# Patient Record
Sex: FEMALE | Race: WHITE | NOT HISPANIC OR LATINO | Employment: FULL TIME | URBAN - METROPOLITAN AREA
[De-identification: names, ages, dates, MRNs, and addresses within clinical notes are randomized per-mention and may not be internally consistent; named-entity substitution may affect disease eponyms.]

---

## 2017-01-26 ENCOUNTER — HOSPITAL ENCOUNTER (EMERGENCY)
Facility: HOSPITAL | Age: 42
Discharge: HOME | End: 2017-01-26
Attending: EMERGENCY MEDICINE | Admitting: EMERGENCY MEDICINE

## 2017-01-26 ENCOUNTER — APPOINTMENT (OUTPATIENT)
Dept: GENERAL RADIOLOGY | Facility: HOSPITAL | Age: 42
End: 2017-01-26

## 2017-01-26 VITALS
DIASTOLIC BLOOD PRESSURE: 90 MMHG | RESPIRATION RATE: 16 BRPM | HEART RATE: 71 BPM | HEIGHT: 65 IN | WEIGHT: 155 LBS | BODY MASS INDEX: 25.83 KG/M2 | TEMPERATURE: 98 F | OXYGEN SATURATION: 98 % | SYSTOLIC BLOOD PRESSURE: 124 MMHG

## 2017-01-26 DIAGNOSIS — S61.210A LACERATION OF RIGHT INDEX FINGER: Primary | ICD-10-CM

## 2017-01-26 PROCEDURE — 90715 TDAP VACCINE 7 YRS/> IM: CPT | Performed by: PHYSICIAN ASSISTANT

## 2017-01-26 PROCEDURE — 90471 IMMUNIZATION ADMIN: CPT | Performed by: PHYSICIAN ASSISTANT

## 2017-01-26 PROCEDURE — 25010000002 TDAP 5-2.5-18.5 LF-MCG/0.5 SUSPENSION: Performed by: PHYSICIAN ASSISTANT

## 2017-01-26 PROCEDURE — 99283 EMERGENCY DEPT VISIT LOW MDM: CPT

## 2017-01-26 PROCEDURE — 73140 X-RAY EXAM OF FINGER(S): CPT

## 2017-01-26 RX ORDER — LIDOCAINE HYDROCHLORIDE 10 MG/ML
10 INJECTION, SOLUTION INFILTRATION; PERINEURAL ONCE
Status: COMPLETED | OUTPATIENT
Start: 2017-01-26 | End: 2017-01-26

## 2017-01-26 RX ADMIN — LIDOCAINE HYDROCHLORIDE 10 ML: 10 INJECTION, SOLUTION INFILTRATION; PERINEURAL at 18:13

## 2017-01-26 RX ADMIN — TETANUS TOXOID, REDUCED DIPHTHERIA TOXOID AND ACELLULAR PERTUSSIS VACCINE, ADSORBED 0.5 ML: 5; 2.5; 8; 8; 2.5 SUSPENSION INTRAMUSCULAR at 18:09

## 2017-01-26 RX ADMIN — SODIUM BICARBONATE 2.5 MEQ: 0.2 INJECTION, SOLUTION INTRAVENOUS at 18:13

## 2017-01-26 NOTE — ED PROVIDER NOTES
I supervised care provided by the midlevel provider.  We have discussed this patient's history, physical exam, and treatment plan.  I have reviewed the note and personally saw and examined the patient and agree with the plan of care.    Pt presents c/o R index finger injury after she accidentally slammed it in a car door. On exam, laceration to the palmar surface of DIP and distal phalanx of R second finger. Tendon function is intact. Finger partially anesthestized now due to digital block. XR R finger shows soft tissue irregularity consistent with injury but no fracture. Laceration repaired in ED by Jeffrey Solorzano PA-C. I agree with plan for splint, discharge, and follow up with hand specialist.     Documentation assistance provided by jose l Gonzalez for Dr. Barry. Information recorded by the scribe was done at my direction and has been verified and validated by me.     Yehuda Gonzalez  01/26/17 1922       Jeffrey Barry MD  01/27/17 9519

## 2017-01-26 NOTE — ED PROVIDER NOTES
EMERGENCY DEPARTMENT ENCOUNTER    CHIEF COMPLAINT  Chief Complaint: Finger Injury  History given by: Patient  History limited by: Nothing  Room Number: 04/04  PMD: Akshat Brooks MD      HPI:  Pt is a 41 y.o. female who presents to the ED after the patient slammed her right index finger in a car door four hours ago. The patient sustained a 1cm laceration to the ulnar aspect of her right index finger. She reports that the door closed on her finger and had to open the door handle to release her finger. Pt has slight decreased sensation in her finger but she denies any decreased motor function. She had a tetanus shot approximately six years ago.      Duration:  1-2 seconds  Onset: Sudden  Timing: Constant  Location: Right index finger  Radiation: None  Quality: Dull  Intensity/Severity: Moderate  Progression: No Change  Associated Symptoms: Laceration  Aggravating Factors: Palpation  Alleviating Factors: Rest  Previous Episodes: None  Treatment before arrival: None mentioned     PAST MEDICAL HISTORY  Active Ambulatory Problems     Diagnosis Date Noted   • No Active Ambulatory Problems     Resolved Ambulatory Problems     Diagnosis Date Noted   • No Resolved Ambulatory Problems     Past Medical History   Diagnosis Date   • Graves disease        PAST SURGICAL HISTORY  Past Surgical History   Procedure Laterality Date   • Mouth surgery         FAMILY HISTORY  Family History   Problem Relation Age of Onset   • No Known Problems Mother    • No Known Problems Sister    • Kidney cancer Maternal Grandmother    • Heart disease Paternal Grandfather        SOCIAL HISTORY  Social History     Social History   • Marital status: Single     Spouse name: N/A   • Number of children: N/A   • Years of education: N/A     Occupational History   • Not on file.     Social History Main Topics   • Smoking status: Never Smoker   • Smokeless tobacco: Not on file   • Alcohol use Yes      Comment: 2-3/week   • Drug use: No   • Sexual activity:  Not on file     Other Topics Concern   • Not on file     Social History Narrative   • No narrative on file       ALLERGIES  Review of patient's allergies indicates no known allergies.    REVIEW OF SYSTEMS  Review of Systems   Constitutional: Negative for chills and fatigue.   HENT: Negative for congestion, rhinorrhea and sore throat.    Eyes: Negative for pain.   Respiratory: Negative for cough, shortness of breath and wheezing.    Cardiovascular: Negative for chest pain, palpitations and leg swelling.   Gastrointestinal: Negative for abdominal pain, diarrhea, nausea and vomiting.   Genitourinary: Negative for difficulty urinating, dysuria, flank pain and frequency.   Musculoskeletal: Negative for arthralgias, myalgias, neck pain and neck stiffness.   Skin: Negative for rash.        Laceration right index finger   Neurological: Negative for dizziness, speech difficulty, weakness, light-headedness, numbness and headaches.   Psychiatric/Behavioral: Negative.    All other systems reviewed and are negative.      PHYSICAL EXAM  ED Triage Vitals   Temp Heart Rate Resp BP SpO2   01/26/17 1300 01/26/17 1300 01/26/17 1300 01/26/17 1311 01/26/17 1300   97.9 °F (36.6 °C) 136 20 158/112 98 %      Temp src Heart Rate Source Patient Position BP Location FiO2 (%)   01/26/17 1300 01/26/17 1300 -- -- --   Tympanic Monitor          Physical Exam   Constitutional: She is oriented to person, place, and time and well-developed, well-nourished, and in no distress. No distress.   Musculoskeletal: Normal range of motion. She exhibits no edema.   Neurological: She is alert and oriented to person, place, and time.   Slight decreased sensation ulnar aspect right index finger distal to laceration, poor two point discrimination.     Skin: Skin is warm and dry. No rash noted.   Laceration right index finger to the distal phalanx, ulnar aspect that does not involve nail bed. Good tendon function.     Psychiatric: Mood and affect normal.   Nursing  note and vitals reviewed.      RADIOLOGY  XR Finger 2+ View Right   Final Result         17:25  Reviewed XR R Finger - Soft tissue irregularity consistent with injury. The osseous structures are normal no fracture. Independently viewed by me. Interpreted by radiologist.     I ordered the above noted radiological studies. Interpreted by radiologist. Reviewed by me in PACS.       PROCEDURES  Laceration Repair  Date/Time: 1/26/2017 6:15 PM  Performed by: BRANNON QUIROZ  Authorized by: BRANNON NOLASCO   Consent: Verbal consent obtained.  Consent given by: patient  Patient understanding: patient states understanding of the procedure being performed  Patient consent: the patient's understanding of the procedure matches consent given  Patient identity confirmed: verbally with patient  Body area: upper extremity  Location details: right index finger  Laceration length: 1 cm  Foreign bodies: no foreign bodies  Tendon involvement: none  Nerve involvement: none  Vascular damage: no  Anesthesia: digital block    Anesthesia:  Anesthesia: digital block  Local Anesthetic: lidocaine 1% with epinephrine   Anesthetic total: 8 mL  Sedation:  Patient sedated: no    Irrigation solution: saline  Irrigation method: syringe  Amount of cleaning: standard  Debridement: none  Degree of undermining: none  Skin closure: 5-0 nylon  Number of sutures: 7  Technique: running  Approximation: close  Approximation difficulty: simple  Patient tolerance: Patient tolerated the procedure well with no immediate complications    Splint Application  Date/Time: 1/26/2017 6:42 PM  Performed by: BRANNON QUIROZ  Authorized by: BRANNON NOLASCO   Consent: Verbal consent obtained.  Consent given by: patient  Patient understanding: patient states understanding of the procedure being performed  Patient consent: the patient's understanding of the procedure matches consent given  Patient identity confirmed: verbally with patient  Location details: right index finger  Splint  type: static finger  Supplies used: aluminum splint  Post-procedure: The splinted body part was neurovascularly unchanged following the procedure.  Patient tolerance: Patient tolerated the procedure well with no immediate complications        PROGRESS AND CONSULTS  ED Course     13:16  Ordered XR R Finger for further evaluation    17:21  Ordered lidocaine for laceration repair     17:50  Discussed case with Dr. Mathias (Hand surgeon). Reviewed history, exam, results and treatments.  Discussed concerns and plan of care.     18:38  Rechecked patient and they are resting comfortably after the laceration repair. Discussed the patient's pertinent imaging results, including XR Finger showed no fracture. Discussed plan to discharge the patient home and recommended follow up with  in 10-14 days to have the sutures removed. Patient agrees with the plan and all questions were addressed.     Latest vital signs   BP- 135/85 HR- 72 Temp- 97.9 °F (36.6 °C) (Tympanic) O2 sat- 95%      MEDICAL DECISION MAKING  Results were reviewed/discussed with the patient and they were also made aware of online access. Pt also made aware that some labs, such as cultures, will not be resulted during ER visit and follow up with PMD is necessary.     MDM  Number of Diagnoses or Management Options     Amount and/or Complexity of Data Reviewed  Tests in the radiology section of CPT®: ordered and reviewed (XR R Finger - Soft tissue irregularity consistent with injury. The osseous structures are normal no fracture.  )    Patient Progress  Patient progress: stable         DIAGNOSIS  Final diagnoses:   Laceration of right index finger       DISPOSITION  DISCHARGE    Patient discharged in stable condition.    Reviewed implications of results, diagnosis, meds, responsibility to follow up, warning signs and symptoms of possible worsening, potential complications and reasons to return to ER, including suture issues.     Patient/Family voiced  understanding of above instructions.    Discussed plan for discharge, as there is no emergent indication for admission.  Pt/family is agreeable and understands need for follow up and repeat testing.  Pt is aware that discharge does not mean that nothing is wrong but it indicates no emergency is present that requires admission and they must continue care with follow-up as given below or physician of their choice.     FOLLOW-UP  You Mathias MD  [address] [id number] [phone]      for suture removal in 10-14 days         Medication List      Notice     No changes were made to your prescriptions during this visit.        Latest Documented Vital Signs:  As of 6:51 PM  BP- 135/85 HR- 72 Temp- 97.9 °F (36.6 °C) (Tympanic) O2 sat- 95%    --  Documentation assistance provided by jose l King for Jeffrey Solorzano PA-C.  Information recorded by the scribe was done at my direction and has been verified and validated by me.       Woody Hendrix  01/26/17 1901       DANIAL Rueda  01/27/17 0004

## 2017-01-27 ENCOUNTER — TELEPHONE (OUTPATIENT)
Dept: SOCIAL WORK | Facility: HOSPITAL | Age: 42
End: 2017-01-27

## 2017-04-10 ENCOUNTER — OFFICE VISIT (OUTPATIENT)
Dept: OBSTETRICS AND GYNECOLOGY | Age: 42
End: 2017-04-10

## 2017-04-10 VITALS
HEIGHT: 65 IN | WEIGHT: 162 LBS | SYSTOLIC BLOOD PRESSURE: 130 MMHG | BODY MASS INDEX: 26.99 KG/M2 | DIASTOLIC BLOOD PRESSURE: 80 MMHG

## 2017-04-10 DIAGNOSIS — Z01.419 ENCOUNTER FOR GYNECOLOGICAL EXAMINATION WITHOUT ABNORMAL FINDING: Primary | ICD-10-CM

## 2017-04-10 DIAGNOSIS — N64.3 GALACTORRHEA IN FEMALE: ICD-10-CM

## 2017-04-10 PROCEDURE — 99396 PREV VISIT EST AGE 40-64: CPT | Performed by: OBSTETRICS & GYNECOLOGY

## 2017-04-10 NOTE — PROGRESS NOTES
Routine Annual Visit    4/10/2017    Patient: Eileen Johnston          MR#:[id number]      Chief Complaint   Patient presents with   • Annual Exam     PT HERE FOR ROUTINE ANNUAL EXAM, DOING WELL. MAMMOGRAM TODAY. LAST PAP 2016 (NEG AND NEG HPV).       History of Present Illness    41 y.o. female No obstetric history on file. who presents for annual exam.  Menses regular  Job is very busy  Slight drop of discharge from breast when pinches nipple, no spont. Discharge and no mass or pain  Yellow to clear color  Graves disease and now in good control  Last 2 paps are neg /neg  Will repeat pap next year  Also complaints of lesion /bump on left labia, not growing, I reassured her last year  Went to [place] and [place] last year, this year to conferences, [place] [facility] [place]           Patient's last menstrual period was 2017 (approximate).  Obstetric History:  OB History      Para Term  AB TAB SAB Ectopic Multiple Living      0                Menstrual History:     Patient's last menstrual period was 2017 (approximate).       Sexual History:       ________________________________________  There is no problem list on file for this patient.      Past Medical History:   Diagnosis Date   • Cervical dysplasia    • GRETCHEN I (cervical intraepithelial neoplasia I)    • Graves disease    • Hyperthyroidism    • LGSIL on Pap smear of cervix        Past Surgical History:   Procedure Laterality Date   • COLPOSCOPY W/ BIOPSY / CURETTAGE     • MOUTH SURGERY         History   Smoking Status   • Never Smoker   Smokeless Tobacco   • Not on file       has a current medication list which includes the following prescription(s): methimazole.  ________________________________________    Current contraception: condoms  History of abnormal Pap smear: yes - hpv, low grade, last 2 paps neg /neg  Family history of Breast cancer: no  Family history of uterine or ovarian cancer: no  Family History of colon cancer/colon polyps:  "no  History of abnormal mammogram: no      The following portions of the patient's history were reviewed and updated as appropriate: allergies, current medications, past family history, past medical history, past social history, past surgical history and problem list.    Review of Systems    Pertinent items are noted in HPI.     Objective   Physical Exam    /80  Ht 65\" (165.1 cm)  Wt 162 lb (73.5 kg)  LMP 03/22/2017 (Approximate)  BMI 26.96 kg/m2   BP Readings from Last 3 Encounters:   04/10/17 130/80   01/26/17 124/90   07/15/16 110/66      Wt Readings from Last 3 Encounters:   04/10/17 162 lb (73.5 kg)   01/26/17 155 lb (70.3 kg)   07/15/16 153 lb (69.4 kg)      BMI: Estimated body mass index is 26.96 kg/(m^2) as calculated from the following:    Height as of this encounter: 65\" (165.1 cm).    Weight as of this encounter: 162 lb (73.5 kg).      General:   alert, appears stated age and cooperative   Abdomen: soft, non-tender, without masses or organomegaly   Breast: inspection negative, no nipple discharge or bleeding, no masses or nodularity palpable   Vulva: normal   Vagina: normal mucosa   Cervix: no cervical motion tenderness and no lesions   Uterus: normal size, mobile or non-tender   Adnexa: normal adnexa and no mass, fullness, tenderness     Assessment:    1. Normal annual exam   Assessment     ICD-10-CM ICD-9-CM   1. Encounter for gynecological examination without abnormal finding Z01.419 V72.31   2. Galactorrhea in female N64.3 611.6     Plan:    Plan     [x]  Mammogram request made  []  PAP done  []  Labs:   []  GC/Chl/TV  []  DEXA scan   []  Referral for colonoscopy:       Eileen was seen today for annual exam.    Diagnoses and all orders for this visit:    Encounter for gynecological examination without abnormal finding    Galactorrhea in female  -     Prolactin      "

## 2017-04-11 ENCOUNTER — TELEPHONE (OUTPATIENT)
Dept: OBSTETRICS AND GYNECOLOGY | Age: 42
End: 2017-04-11

## 2017-04-11 LAB — PROLACTIN SERPL-MCNC: 11.9 NG/ML (ref 4.8–23.3)

## 2017-04-11 NOTE — TELEPHONE ENCOUNTER
----- Message from Stephanie Orta MD sent at 4/11/2017  1:38 PM EDT -----  Notify negative/ normal prolactin level

## 2017-09-07 ENCOUNTER — OFFICE VISIT (OUTPATIENT)
Dept: FAMILY MEDICINE CLINIC | Facility: CLINIC | Age: 42
End: 2017-09-07

## 2017-09-07 VITALS
BODY MASS INDEX: 26.16 KG/M2 | RESPIRATION RATE: 16 BRPM | WEIGHT: 157 LBS | DIASTOLIC BLOOD PRESSURE: 64 MMHG | OXYGEN SATURATION: 99 % | HEIGHT: 65 IN | HEART RATE: 64 BPM | SYSTOLIC BLOOD PRESSURE: 110 MMHG

## 2017-09-07 DIAGNOSIS — H53.9 TRANSIENT VISION DISTURBANCE OF BOTH EYES: ICD-10-CM

## 2017-09-07 DIAGNOSIS — Z00.00 ROUTINE GENERAL MEDICAL EXAMINATION AT A HEALTH CARE FACILITY: Primary | ICD-10-CM

## 2017-09-07 DIAGNOSIS — S67.21XS: ICD-10-CM

## 2017-09-07 DIAGNOSIS — E05.00 GRAVES DISEASE: ICD-10-CM

## 2017-09-07 PROBLEM — S67.10XA CRUSHING INJURY OF FINGER OF RIGHT HAND: Status: ACTIVE | Noted: 2017-01-01

## 2017-09-07 LAB
ALBUMIN SERPL-MCNC: 4.2 G/DL (ref 3.5–5.2)
ALBUMIN/GLOB SERPL: 1.2 G/DL
ALP SERPL-CCNC: 58 U/L (ref 39–117)
ALT SERPL-CCNC: 14 U/L (ref 1–33)
AST SERPL-CCNC: 17 U/L (ref 1–32)
BILIRUB SERPL-MCNC: 0.6 MG/DL (ref 0.1–1.2)
BUN SERPL-MCNC: 10 MG/DL (ref 6–20)
BUN/CREAT SERPL: 13.3 (ref 7–25)
CALCIUM SERPL-MCNC: 8.9 MG/DL (ref 8.6–10.5)
CHLORIDE SERPL-SCNC: 102 MMOL/L (ref 98–107)
CHOLEST SERPL-MCNC: 173 MG/DL (ref 0–200)
CHOLEST/HDLC SERPL: 2.54 {RATIO}
CO2 SERPL-SCNC: 27.1 MMOL/L (ref 22–29)
CREAT SERPL-MCNC: 0.75 MG/DL (ref 0.57–1)
ERYTHROCYTE [DISTWIDTH] IN BLOOD BY AUTOMATED COUNT: 13.6 % (ref 11.7–13)
GLOBULIN SER CALC-MCNC: 3.5 GM/DL
GLUCOSE SERPL-MCNC: 89 MG/DL (ref 65–99)
HCT VFR BLD AUTO: 42.6 % (ref 35.6–45.5)
HDLC SERPL-MCNC: 68 MG/DL (ref 40–60)
HGB BLD-MCNC: 13.4 G/DL (ref 11.9–15.5)
LDLC SERPL CALC-MCNC: 92 MG/DL (ref 0–100)
MCH RBC QN AUTO: 28.6 PG (ref 26.9–32)
MCHC RBC AUTO-ENTMCNC: 31.5 G/DL (ref 32.4–36.3)
MCV RBC AUTO: 90.8 FL (ref 80.5–98.2)
PLATELET # BLD AUTO: 261 10*3/MM3 (ref 140–500)
POTASSIUM SERPL-SCNC: 4.6 MMOL/L (ref 3.5–5.2)
PROT SERPL-MCNC: 7.7 G/DL (ref 6–8.5)
RBC # BLD AUTO: 4.69 10*6/MM3 (ref 3.9–5.2)
SODIUM SERPL-SCNC: 141 MMOL/L (ref 136–145)
TRIGL SERPL-MCNC: 64 MG/DL (ref 0–150)
VLDLC SERPL CALC-MCNC: 12.8 MG/DL (ref 5–40)
WBC # BLD AUTO: 6.29 10*3/MM3 (ref 4.5–10.7)

## 2017-09-07 PROCEDURE — 99213 OFFICE O/P EST LOW 20 MIN: CPT | Performed by: FAMILY MEDICINE

## 2017-09-07 PROCEDURE — 99396 PREV VISIT EST AGE 40-64: CPT | Performed by: FAMILY MEDICINE

## 2017-09-07 NOTE — PATIENT INSTRUCTIONS
Annual Wellness  Personal Prevention Plan of Service   I will call you with lab results.  Call me if you decide you want an OT referral.  If you have a vision disturbance again, walk away from the computer and if it  Does not resolve in 15-20 minutes, go to the ER.  Date of Office Visit:  2017  Encounter Provider:  Akshat Brooks MD  Place of Service:  [facility]  Patient Name: Eileen Johnston  :  1975    As part of the Annual Wellness portion of your visit today, we are providing you with this personalized preventive plan of services (PPPS). This plan is based upon recommendations of the [place] Preventive Services Task Force (USPSTF) and the Advisory Committee on Immunization Practices (ACIP).    This lists the preventive care services that should be considered, and provides dates of when you are due. Items listed as completed are up-to-date and do not require any further intervention.    Health Maintenance   Topic Date Due   • PAP SMEAR  07/15/2019   • TDAP/TD VACCINES (3 - Td) 2027   • INFLUENZA VACCINE  Addressed       Orders Placed This Encounter   Procedures   • CBC (No Diff)     Order Specific Question:   [facility] Has the patient fasted?     Answer:   Yes   • Comprehensive Metabolic Panel     Order Specific Question:   [facility] Has the patient fasted?     Answer:   Yes   • Lipid Panel With / Chol / HDL Ratio     Order Specific Question:   [facility] Has the patient fasted?     Answer:   Yes       Return in about 1 year (around 2018) for Annual physical.

## 2017-09-07 NOTE — PROGRESS NOTES
"Preventive Exam    History of Present Illness: Eileen is here for check up and review of routine health maintenance. She states she is doing well and has no concerns except she has a hx of Graves disease and has been followed endocrinology. She has been on methimazole.   She has a past hx of an injured right index finger. This happened in January after she shut a car door on her finger. She was very well repaired by the [facility]. She is having some discomfort but has full range of motion and she is wondering if there is anything she should be doing.  She also reports having  2 episodes of vision disturbance after she has been working on the computer for several hours. Both episodes occurred after a full day of work and concentration. Her vision would get blurry. She would stop and rest and noticed that after 15 minutes or so, all returned to normal.   REVIEW OF SYSTEMS  Constitutional: Negative.    HENT: Negative.    Eyes: Negative.    Respiratory: Negative.    Cardiovascular: Negative.    Gastrointestinal: Negative.    Endocrine: Negative.    Genitourinary: Negative.    Musculoskeletal: Negative.  Skin: Negative.    Allergic/Immunologic: Negative.    Neurological: Negative.    Hematological: Negative.    Psychiatric/Behavioral: Negative.    All other systems reviewed and are negative.          PHYSICAL EXAM    Vitals:    09/07/17 1018   BP: 110/64   Pulse: 64   Resp: 16   SpO2: 99%   Weight: 157 lb (71.2 kg)   Height: 65\" (165.1 cm)     GENERAL: alert and oriented, afebrile and vital signs stable  HEENT: oral mucosa moist, PEERLA, EOM, conjunctiva normal  No cervical adenopathy  LUNGS: clear to ascultation bilaterally, no rales, ronchi or wheezing  HEART: RRR S1 S2 without murmers, thrills, rubs or gallops  CHEST WALL: within normal limits, no tenderness  ABDOMEN: WNL. Normal BS.  EXTREMITIES: No clubbing, cyanosis or edema noted. Normal Pulses.  SKIN: warm, dry, no rashes noted  NEURO: CN II- XII grossly " intact    ASSESSMENT AND PLAN  Problem List Items Addressed This Visit        Endocrine    Graves disease       Other    Crushing injury of finger of right hand  She has recovered well and I have advised  Stress ball to exercise, call me if she would like OT.      Other Visit Diagnoses     Routine general medical examination at a health care facility    -  Primary    Relevant Orders    CBC (No Diff) (Completed)    Comprehensive Metabolic Panel (Completed)    Lipid Panel With / Chol / HDL Ratio (Completed)    Transient vision disturbance of both eyes     Most likely due to fatigue, could be an ocular migraine.  Advised her to call should it happen again or go to ER.        Routine health maintenance reviewed and discussed with Eileen.    .  Orders Placed This Encounter   Procedures   • CBC (No Diff)     Order Specific Question:   [facility] Has the patient fasted?     Answer:   Yes   • Comprehensive Metabolic Panel     Order Specific Question:   [facility] Has the patient fasted?     Answer:   Yes   • Lipid Panel With / Chol / HDL Ratio     Order Specific Question:   [facility] Has the patient fasted?     Answer:   Yes     Return in about 1 year (around 9/7/2018) for Annual physical.

## 2017-09-13 DIAGNOSIS — R53.83 OTHER FATIGUE: Primary | ICD-10-CM

## 2017-09-18 ENCOUNTER — RESULTS ENCOUNTER (OUTPATIENT)
Dept: FAMILY MEDICINE CLINIC | Facility: CLINIC | Age: 42
End: 2017-09-18

## 2017-09-18 ENCOUNTER — TELEPHONE (OUTPATIENT)
Dept: OBSTETRICS AND GYNECOLOGY | Age: 42
End: 2017-09-18

## 2017-09-18 DIAGNOSIS — R53.83 OTHER FATIGUE: ICD-10-CM

## 2017-09-18 NOTE — TELEPHONE ENCOUNTER
Dr HAQ pt, aware Dr HAQ will not be in til September 28, 41 yo not in a relationship but wants to discuss trying to get pregt, what is the first step Dr HAQ would have her do?    Also in her my chart her b/p was listed at 130/80 and it was re-checked to be normal, the 2nd b/p is not listed

## 2017-09-21 LAB — VIT B12 SERPL-MCNC: 365 PG/ML (ref 211–946)

## 2017-09-23 LAB — 25(OH)D3+25(OH)D2 SERPL-MCNC: 19.7 NG/ML (ref 30–100)

## 2017-09-25 DIAGNOSIS — M79.644 THUMB PAIN, RIGHT: Primary | ICD-10-CM

## 2017-09-25 LAB — METHYLMALONATE SERPL-SCNC: 156 NMOL/L (ref 0–378)

## 2017-09-28 LAB — SPECIMEN STATUS: NORMAL

## 2017-10-01 ENCOUNTER — TELEPHONE (OUTPATIENT)
Dept: OBSTETRICS AND GYNECOLOGY | Age: 42
End: 2017-10-01

## 2018-04-23 ENCOUNTER — OFFICE VISIT (OUTPATIENT)
Dept: OBSTETRICS AND GYNECOLOGY | Age: 43
End: 2018-04-23

## 2018-04-23 VITALS
BODY MASS INDEX: 26.82 KG/M2 | DIASTOLIC BLOOD PRESSURE: 66 MMHG | SYSTOLIC BLOOD PRESSURE: 112 MMHG | HEIGHT: 65 IN | WEIGHT: 161 LBS

## 2018-04-23 DIAGNOSIS — Z12.4 SCREENING FOR CERVICAL CANCER: ICD-10-CM

## 2018-04-23 DIAGNOSIS — Z01.419 ENCOUNTER FOR GYNECOLOGICAL EXAMINATION WITHOUT ABNORMAL FINDING: Primary | ICD-10-CM

## 2018-04-23 PROCEDURE — 99396 PREV VISIT EST AGE 40-64: CPT | Performed by: OBSTETRICS & GYNECOLOGY

## 2018-04-23 NOTE — PROGRESS NOTES
Routine Annual Visit    2018    Patient: Eileen Johnston          MR#:[id number]      Chief Complaint   Patient presents with   • Annual Exam     PT HERE FOR ROUTINE AE AND MG. SHE IS WELL WITH NO COMPLAINTS. LAST PAP  (NEG AND NEG HPV). FORGOT TO CONFIRM MEDS.       History of Present Illness    42 y.o. female  who presents for annual exam.   Pt is concerned about her BP reading of 130/80 in 2017 which she remembers as a false reading  I told pt I would record in chart her concern  Reg menses without issues  mammo today  UTD pap  Graves disease has resolved for now and off meds  Discussed possible future pregnancy and pt may pursue a RE consult  Secondary to age and no committed relationship right now            Patient's last menstrual period was 2018.  Obstetric History:  OB History      Para Term  AB Living    0 0 0 0 0 0    SAB TAB Ectopic Molar Multiple Live Births    0 0 0 0 0 0         Menstrual History:     Patient's last menstrual period was 2018.       Sexual History:       ________________________________________  Patient Active Problem List   Diagnosis   • Graves disease   • Crushing injury of finger of right hand       Past Medical History:   Diagnosis Date   • Cervical dysplasia    • GRETCHEN I (cervical intraepithelial neoplasia I)    • Graves disease    • Hyperthyroidism    • LGSIL on Pap smear of cervix        Past Surgical History:   Procedure Laterality Date   • COLPOSCOPY W/ BIOPSY / CURETTAGE     • MOUTH SURGERY         History   Smoking Status   • Never Smoker   Smokeless Tobacco   • Never Used       has a current medication list which includes the following prescription(s): methimazole.  ________________________________________    Current contraception: abstinence  History of abnormal Pap smear: no  Family history of Breast cancer: no  Family history of uterine or ovarian cancer: no  Family History of colon cancer/colon polyps: no  History of abnormal  "mammogram: no      The following portions of the patient's history were reviewed and updated as appropriate: allergies, current medications, past family history, past medical history, past social history, past surgical history and problem list.    Review of Systems    Pertinent items are noted in HPI.     Objective   Physical Exam    /66   Ht 165.1 cm (65\")   Wt 73 kg (161 lb)   LMP 04/02/2018   BMI 26.79 kg/m²    BP Readings from Last 3 Encounters:   04/23/18 112/66   09/07/17 110/64   04/10/17 130/80      Wt Readings from Last 3 Encounters:   04/23/18 73 kg (161 lb)   09/07/17 71.2 kg (157 lb)   04/10/17 73.5 kg (162 lb)      BMI: Estimated body mass index is 26.79 kg/m² as calculated from the following:    Height as of this encounter: 165.1 cm (65\").    Weight as of this encounter: 73 kg (161 lb).      General:   alert, appears stated age and cooperative   Abdomen: soft, non-tender, without masses or organomegaly   Breast: inspection negative, no nipple discharge or bleeding, no masses or nodularity palpable   Vulva: normal   Vagina: normal mucosa   Cervix: no cervical motion tenderness and no lesions   Uterus: normal size, mobile or non-tender   Adnexa: no mass, fullness, tenderness     Assessment:    1. Normal annual exam   Assessment     ICD-10-CM ICD-9-CM   1. Encounter for gynecological examination without abnormal finding Z01.419 V72.31   2. Screening for cervical cancer Z12.4 V76.2     Plan:    Plan     [x]  Mammogram request made  [x]  PAP done  []  Labs:   []  GC/Chl/TV  []  DEXA scan   []  Referral for colonoscopy:       Eileen was seen today for annual exam.    Diagnoses and all orders for this visit:    Encounter for gynecological examination without abnormal finding  -     IGP, Apt HPV,rfx 16 / 18,45 - ThinPrep Vial, Cervix    Screening for cervical cancer  -     IGP, Apt HPV,rfx 16 / 18,45 - ThinPrep Vial, Cervix        RE referral HO given    Counseling:  --Nutrition: Stressed " importance of moderation and caloric balance, stressed fresh fruit and vegetables  --Exercise: Stressed the importance of regular exercise. 3-5 times weekly   - Discussed screening mammogram recommendations.   --Discussed benefits of screening colonoscopy- age 50 unless FH  --Discussed pap smear screening recommendations

## 2018-04-25 LAB
CYTOLOGIST CVX/VAG CYTO: NORMAL
CYTOLOGY CVX/VAG DOC THIN PREP: NORMAL
DX ICD CODE: NORMAL
HIV 1 & 2 AB SER-IMP: NORMAL
HPV I/H RISK 4 DNA CVX QL PROBE+SIG AMP: NEGATIVE
OTHER STN SPEC: NORMAL
PATH REPORT.FINAL DX SPEC: NORMAL
STAT OF ADQ CVX/VAG CYTO-IMP: NORMAL

## 2018-05-01 ENCOUNTER — TELEPHONE (OUTPATIENT)
Dept: FAMILY MEDICINE CLINIC | Facility: CLINIC | Age: 43
End: 2018-05-01

## 2018-05-01 ENCOUNTER — OFFICE VISIT (OUTPATIENT)
Dept: FAMILY MEDICINE CLINIC | Facility: CLINIC | Age: 43
End: 2018-05-01

## 2018-05-01 VITALS
WEIGHT: 160 LBS | SYSTOLIC BLOOD PRESSURE: 128 MMHG | HEART RATE: 65 BPM | BODY MASS INDEX: 26.66 KG/M2 | TEMPERATURE: 98.1 F | DIASTOLIC BLOOD PRESSURE: 92 MMHG | OXYGEN SATURATION: 99 % | HEIGHT: 65 IN

## 2018-05-01 DIAGNOSIS — J01.00 ACUTE NON-RECURRENT MAXILLARY SINUSITIS: Primary | ICD-10-CM

## 2018-05-01 PROCEDURE — 99212 OFFICE O/P EST SF 10 MIN: CPT | Performed by: FAMILY MEDICINE

## 2018-05-01 NOTE — PATIENT INSTRUCTIONS
Try over the counter Afrin nasal spray twice daily for the next 3 days along with Mucinex.      If symptoms not improving by Friday, please call clinic.      Continue Allegra for the next month.        Sinusitis, Adult  Sinusitis is soreness and inflammation of your sinuses. Sinuses are hollow spaces in the bones around your face. Your sinuses are located:  · Around your eyes.  · In the middle of your forehead.  · Behind your nose.  · In your cheekbones.  Your sinuses and nasal passages are lined with a stringy fluid (mucus). Mucus normally drains out of your sinuses. When your nasal tissues become inflamed or swollen, the mucus can become trapped or blocked so air cannot flow through your sinuses. This allows bacteria, viruses, and funguses to grow, which leads to infection.  Sinusitis can develop quickly and last for 7?10 days (acute) or for more than 12 weeks (chronic). Sinusitis often develops after a cold.  What are the causes?  This condition is caused by anything that creates swelling in the sinuses or stops mucus from draining, including:  · Allergies.  · Asthma.  · Bacterial or viral infection.  · Abnormally shaped bones between the nasal passages.  · Nasal growths that contain mucus (nasal polyps).  · Narrow sinus openings.  · Pollutants, such as chemicals or irritants in the air.  · A foreign object stuck in the nose.  · A fungal infection. This is rare.  What increases the risk?  The following factors may make you more likely to develop this condition:  · Having allergies or asthma.  · Having had a recent cold or respiratory tract infection.  · Having structural deformities or blockages in your nose or sinuses.  · Having a weak immune system.  · Doing a lot of swimming or diving.  · Overusing nasal sprays.  · Smoking.  What are the signs or symptoms?  The main symptoms of this condition are pain and a feeling of pressure around the affected sinuses. Other symptoms include:  · Upper  toothache.  · Earache.  · Headache.  · Bad breath.  · Decreased sense of smell and taste.  · A cough that may get worse at night.  · Fatigue.  · Fever.  · Thick drainage from your nose. The drainage is often green and it may contain pus (purulent).  · Stuffy nose or congestion.  · Postnasal drip. This is when extra mucus collects in the throat or back of the nose.  · Swelling and warmth over the affected sinuses.  · Sore throat.  · Sensitivity to light.  How is this diagnosed?  This condition is diagnosed based on symptoms, a medical history, and a physical exam. To find out if your condition is acute or chronic, your health care provider may:  · Look in your nose for signs of nasal polyps.  · Tap over the affected sinus to check for signs of infection.  · View the inside of your sinuses using an imaging device that has a light attached (endoscope).  If your health care provider suspects that you have chronic sinusitis, you may also:  · Be tested for allergies.  · Have a sample of mucus taken from your nose (nasal culture) and checked for bacteria.  · Have a mucus sample examined to see if your sinusitis is related to an allergy.  If your sinusitis does not respond to treatment and it lasts longer than 8 weeks, you may have an MRI or CT scan to check your sinuses. These scans also help to determine how severe your infection is.  In rare cases, a bone biopsy may be done to rule out more serious types of fungal sinus disease.  How is this treated?  Treatment for sinusitis depends on the cause and whether your condition is chronic or acute. If a virus is causing your sinusitis, your symptoms will go away on their own within 10 days. You may be given medicines to relieve your symptoms, including:  · Topical nasal decongestants. They shrink swollen nasal passages and let mucus drain from your sinuses.  · Antihistamines. These drugs block inflammation that is triggered by allergies. This can help to ease swelling in your  nose and sinuses.  · Topical nasal corticosteroids. These are nasal sprays that ease inflammation and swelling in your nose and sinuses.  · Nasal saline washes. These rinses can help to get rid of thick mucus in your nose.  If your condition is caused by bacteria, you will be given an antibiotic medicine. If your condition is caused by a fungus, you will be given an antifungal medicine.  Surgery may be needed to correct underlying conditions, such as narrow nasal passages. Surgery may also be needed to remove polyps.  Follow these instructions at home:  Medicines   · Take, use, or apply over-the-counter and prescription medicines only as told by your health care provider. These may include nasal sprays.  · If you were prescribed an antibiotic medicine, take it as told by your health care provider. Do not stop taking the antibiotic even if you start to feel better.  Hydrate and Humidify   · Drink enough water to keep your urine clear or pale yellow. Staying hydrated will help to thin your mucus.  · Use a cool mist humidifier to keep the humidity level in your home above 50%.  · Inhale steam for 10-15 minutes, 3-4 times a day or as told by your health care provider. You can do this in the bathroom while a hot shower is running.  · Limit your exposure to cool or dry air.  Rest   · Rest as much as possible.  · Sleep with your head raised (elevated).  · Make sure to get enough sleep each night.  General instructions   · Apply a warm, moist washcloth to your face 3-4 times a day or as told by your health care provider. This will help with discomfort.  · Wash your hands often with soap and water to reduce your exposure to viruses and other germs. If soap and water are not available, use hand .  · Do not smoke. Avoid being around people who are smoking (secondhand smoke).  · Keep all follow-up visits as told by your health care provider. This is important.  Contact a health care provider if:  · You have a  fever.  · Your symptoms get worse.  · Your symptoms do not improve within 10 days.  Get help right away if:  · You have a severe headache.  · You have persistent vomiting.  · You have pain or swelling around your face or eyes.  · You have vision problems.  · You develop confusion.  · Your neck is stiff.  · You have trouble breathing.  This information is not intended to replace advice given to you by your health care provider. Make sure you discuss any questions you have with your health care provider.  Document Released: 12/18/2006 Document Revised: 08/13/2017 Document Reviewed: 10/12/2016  [employer] Interactive Patient Education © 2017 [employer]

## 2018-07-31 ENCOUNTER — OFFICE VISIT (OUTPATIENT)
Dept: FAMILY MEDICINE CLINIC | Facility: CLINIC | Age: 43
End: 2018-07-31

## 2018-07-31 VITALS
HEIGHT: 65 IN | DIASTOLIC BLOOD PRESSURE: 72 MMHG | SYSTOLIC BLOOD PRESSURE: 120 MMHG | OXYGEN SATURATION: 99 % | WEIGHT: 160 LBS | BODY MASS INDEX: 26.66 KG/M2 | HEART RATE: 73 BPM
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
## 2018-07-31 LAB
ALBUMIN SERPL-MCNC: 4.3 G/DL (ref 3.5–5.2)
ALBUMIN/GLOB SERPL: 1.5 G/DL
ALP SERPL-CCNC: 55 U/L (ref 39–117)
ALT SERPL-CCNC: 13 U/L (ref 1–33)
AST SERPL-CCNC: 18 U/L (ref 1–32)
BASOPHILS # BLD AUTO: 0.02 10*3/MM3 (ref 0–0.2)
BASOPHILS NFR BLD AUTO: 0.3 % (ref 0–1.5)
BILIRUB SERPL-MCNC: 0.5 MG/DL (ref 0.1–1.2)
BUN SERPL-MCNC: 9 MG/DL (ref 6–20)
BUN/CREAT SERPL: 13.6 (ref 7–25)
CALCIUM SERPL-MCNC: 8.7 MG/DL (ref 8.6–10.5)
CHLORIDE SERPL-SCNC: 103 MMOL/L (ref 98–107)
CO2 SERPL-SCNC: 25.7 MMOL/L (ref 22–29)
CREAT SERPL-MCNC: 0.66 MG/DL (ref 0.57–1)
EOSINOPHIL # BLD AUTO: 0.32 10*3/MM3 (ref 0–0.7)
EOSINOPHIL NFR BLD AUTO: 5 % (ref 0.3–6.2)
ERYTHROCYTE [DISTWIDTH] IN BLOOD BY AUTOMATED COUNT: 13.6 % (ref 11.7–13)
GLOBULIN SER CALC-MCNC: 2.8 GM/DL
GLUCOSE SERPL-MCNC: 86 MG/DL (ref 65–99)
HCT VFR BLD AUTO: 40.9 % (ref 35.6–45.5)
HGB BLD-MCNC: 13 G/DL (ref 11.9–15.5)
IMM GRANULOCYTES # BLD: 0.01 10*3/MM3 (ref 0–0.03)
IMM GRANULOCYTES NFR BLD: 0.2 % (ref 0–0.5)
LYMPHOCYTES # BLD AUTO: 2.08 10*3/MM3 (ref 0.9–4.8)
LYMPHOCYTES NFR BLD AUTO: 32.3 % (ref 19.6–45.3)
MCH RBC QN AUTO: 28.4 PG (ref 26.9–32)
MCHC RBC AUTO-ENTMCNC: 31.8 G/DL (ref 32.4–36.3)
MCV RBC AUTO: 89.5 FL (ref 80.5–98.2)
MONOCYTES # BLD AUTO: 0.47 10*3/MM3 (ref 0.2–1.2)
MONOCYTES NFR BLD AUTO: 7.3 % (ref 5–12)
NEUTROPHILS # BLD AUTO: 3.55 10*3/MM3 (ref 1.9–8.1)
NEUTROPHILS NFR BLD AUTO: 55.1 % (ref 42.7–76)
PLATELET # BLD AUTO: 251 10*3/MM3 (ref 140–500)
POTASSIUM SERPL-SCNC: 4.4 MMOL/L (ref 3.5–5.2)
PROT SERPL-MCNC: 7.1 G/DL (ref 6–8.5)
RBC # BLD AUTO: 4.57 10*6/MM3 (ref 3.9–5.2)
SODIUM SERPL-SCNC: 138 MMOL/L (ref 136–145)
WBC # BLD AUTO: 6.44 10*3/MM3 (ref 4.5–10.7)

## 2018-07-31 PROCEDURE — 99212 OFFICE O/P EST SF 10 MIN: CPT | Performed by: NURSE PRACTITIONER

## 2018-07-31 NOTE — PROGRESS NOTES
"Eileen Johnston is a 42 y.o. female.Patient states that she has bruising on the back of the left leg that has two puncture marks. She first noticed this on Saturday. There is mild pain over the puncture tanya. There is no pain with ambulation. No fever of adenopathy.    Also is concerned about her BP reading today she usually runs 100 over 70 or around there.    Assessment/Plan   Problem List Items Addressed This Visit     None      Visit Diagnoses     Insect bite, initial encounter    -  Primary    Relevant Orders    CBC & Differential    Comprehensive Metabolic Panel             No Follow-up on file.  There are no Patient Instructions on file for this visit.    No chief complaint on file.    Social History   Substance Use Topics   • Smoking status: Never Smoker   • Smokeless tobacco: Never Used   • Alcohol use Yes     3 Glasses of wine per week      Comment: 2-3/week       History of Present Illness     The following portions of the patient's history were reviewed and updated as appropriate:PMHroutine: Social history , Allergies, Current Medications and Active Problem List    Review of Systems   Constitutional: Negative for fatigue and fever.   Cardiovascular: Negative for chest pain and leg swelling.   Musculoskeletal: Negative for gait problem.   Skin:        Bruise to left posterior thigh   Neurological: Negative for dizziness and headaches.       Objective   Vitals:    07/31/18 1302   BP: 120/72   Pulse: 73   SpO2: 99%   Weight: 72.6 kg (160 lb)   Height: 165.1 cm (65\")     Body mass index is 26.63 kg/m².  Physical Exam   Constitutional: She appears well-developed and well-nourished. No distress.   HENT:   Head: Normocephalic and atraumatic.   Mouth/Throat: Oropharynx is clear and moist.   Eyes: EOM are normal.   Neck: Neck supple.   Cardiovascular: Normal rate and regular rhythm.    Pulmonary/Chest: Effort normal.   Musculoskeletal: Normal range of motion.   Large bruise to left posterior thigh no induration of " drainage or erythema to area   Lymphadenopathy:     She has no cervical adenopathy.   Neurological: She is alert.   Skin: Skin is warm.   Nursing note and vitals reviewed.    Reviewed Data:  No visits with results within 1 Month(s) from this visit.   Latest known visit with results is:   Office Visit on 04/23/2018   Component Date Value Ref Range Status   • Diagnosis 04/23/2018 Comment   Final    NEGATIVE FOR INTRAEPITHELIAL LESION AND MALIGNANCY.   • Specimen adequacy: 04/23/2018 Comment   Final    Comment: Satisfactory for evaluation.  Endocervical and/or squamous metaplastic  cells (endocervical component) are present.     • Clinician Provided ICD-10: 04/23/2018 Comment   Final    Comment: Z01.419  Z12.4     • Performed by: 04/23/2018 Comment   Final    Shen Haynes Cytotechnologist (ASCP)   • . 04/23/2018 .   Final   • Note: 04/23/2018 Comment   Final    Comment: The Pap smear is a screening test designed to aid in the detection of  premalignant and malignant conditions of the uterine cervix.  It is not a  diagnostic procedure and should not be used as the sole means of detecting  cervical cancer.  Both false-positive and false-negative reports do occur.     • Method: 04/23/2018 Comment   Final    Comment: This liquid based ThinPrep(R) pap test was screened with the  use of an image guided system.     • HPV Aptima 04/23/2018 Negative  Negative Final    Comment: This test detects fourteen high-risk HPV types (16/18/31/33/35/39/45/  51/52/56/58/59/66/68) without differentiation.       She is going to keep a BP diary and return the results to the office.

## 2018-10-15 DIAGNOSIS — Z00.00 ROUTINE GENERAL MEDICAL EXAMINATION AT A HEALTH CARE FACILITY: Primary | ICD-10-CM

## 2018-10-15 DIAGNOSIS — Z13.220 SCREENING FOR LIPOID DISORDERS: ICD-10-CM

## 2018-10-16 LAB
ALBUMIN SERPL-MCNC: 4.2 G/DL (ref 3.5–5.2)
ALBUMIN/GLOB SERPL: 1.4 G/DL
ALP SERPL-CCNC: 58 U/L (ref 39–117)
ALT SERPL-CCNC: 14 U/L (ref 1–33)
AST SERPL-CCNC: 17 U/L (ref 1–32)
BILIRUB SERPL-MCNC: 0.4 MG/DL (ref 0.1–1.2)
BUN SERPL-MCNC: 8 MG/DL (ref 6–20)
BUN/CREAT SERPL: 11.6 (ref 7–25)
CALCIUM SERPL-MCNC: 9 MG/DL (ref 8.6–10.5)
CHLORIDE SERPL-SCNC: 104 MMOL/L (ref 98–107)
CHOLEST SERPL-MCNC: 127 MG/DL (ref 0–200)
CO2 SERPL-SCNC: 22.4 MMOL/L (ref 22–29)
CREAT SERPL-MCNC: 0.69 MG/DL (ref 0.57–1)
ERYTHROCYTE [DISTWIDTH] IN BLOOD BY AUTOMATED COUNT: 12.9 % (ref 11.7–13)
GLOBULIN SER CALC-MCNC: 3.1 GM/DL
GLUCOSE SERPL-MCNC: 91 MG/DL (ref 65–99)
HCT VFR BLD AUTO: 40.7 % (ref 35.6–45.5)
HDLC SERPL-MCNC: 50 MG/DL (ref 40–60)
HGB BLD-MCNC: 13.3 G/DL (ref 11.9–15.5)
LDLC SERPL CALC-MCNC: 66 MG/DL (ref 0–100)
LDLC/HDLC SERPL: 1.32 {RATIO}
MCH RBC QN AUTO: 28.9 PG (ref 26.9–32)
MCHC RBC AUTO-ENTMCNC: 32.7 G/DL (ref 32.4–36.3)
MCV RBC AUTO: 88.3 FL (ref 80.5–98.2)
PLATELET # BLD AUTO: 237 10*3/MM3 (ref 140–500)
POTASSIUM SERPL-SCNC: 4.6 MMOL/L (ref 3.5–5.2)
PROT SERPL-MCNC: 7.3 G/DL (ref 6–8.5)
RBC # BLD AUTO: 4.61 10*6/MM3 (ref 3.9–5.2)
SODIUM SERPL-SCNC: 139 MMOL/L (ref 136–145)
TRIGL SERPL-MCNC: 54 MG/DL (ref 0–150)
VLDLC SERPL CALC-MCNC: 10.8 MG/DL (ref 5–40)
WBC # BLD AUTO: 5.29 10*3/MM3 (ref 4.5–10.7)

## 2019-01-08 ENCOUNTER — OFFICE VISIT (OUTPATIENT)
Dept: FAMILY MEDICINE CLINIC | Facility: CLINIC | Age: 44
End: 2019-01-08

## 2019-01-08 VITALS
BODY MASS INDEX: 24.43 KG/M2 | HEART RATE: 79 BPM | HEIGHT: 65 IN | WEIGHT: 146.6 LBS | RESPIRATION RATE: 16 BRPM | OXYGEN SATURATION: 99 %

## 2019-01-08 DIAGNOSIS — S30.0XXA TRAUMATIC HEMATOMA OF BUTTOCK, INITIAL ENCOUNTER: Primary | ICD-10-CM

## 2019-01-08 PROCEDURE — 99213 OFFICE O/P EST LOW 20 MIN: CPT | Performed by: FAMILY MEDICINE

## 2019-01-23 ENCOUNTER — OFFICE VISIT (OUTPATIENT)
Dept: FAMILY MEDICINE CLINIC | Facility: CLINIC | Age: 44
End: 2019-01-23

## 2019-01-23 VITALS
RESPIRATION RATE: 16 BRPM | SYSTOLIC BLOOD PRESSURE: 98 MMHG | BODY MASS INDEX: 24.37 KG/M2 | WEIGHT: 146.3 LBS | OXYGEN SATURATION: 99 % | DIASTOLIC BLOOD PRESSURE: 60 MMHG | HEIGHT: 65 IN | HEART RATE: 60 BPM

## 2019-01-23 DIAGNOSIS — Z00.00 ROUTINE GENERAL MEDICAL EXAMINATION AT A HEALTH CARE FACILITY: Primary | ICD-10-CM

## 2019-01-23 PROCEDURE — 99396 PREV VISIT EST AGE 40-64: CPT | Performed by: FAMILY MEDICINE

## 2019-01-23 NOTE — PATIENT INSTRUCTIONS
Annual Wellness  Personal Prevention Plan of Service     Date of Office Visit:  2019  Encounter Provider:  Akshat Brooks MD  Place of Service:  [facility]  Patient Name: Eileen Johnston  :  1975    As part of the Annual Wellness portion of your visit today, we are providing you with this personalized preventive plan of services (PPPS). This plan is based upon recommendations of the [place] Preventive Services Task Force (USPSTF) and the Advisory Committee on Immunization Practices (ACIP).    This lists the preventive care services that should be considered, and provides dates of when you are due. Items listed as completed are up-to-date and do not require any further intervention.    Health Maintenance   Topic Date Due   • ANNUAL PHYSICAL  2020   • PAP SMEAR  2021   • TDAP/TD VACCINES (3 - Td) 2027   • INFLUENZA VACCINE  Addressed       No orders of the defined types were placed in this encounter.      Return in about 1 year (around 2020) for Annual physical.

## 2019-01-23 NOTE — PROGRESS NOTES
"Preventive Exam    History of Present Illness: Eileen is here for check up and review of routine health maintenance. She states she is doing well and has no concerns.      REVIEW OF SYSTEMS  Constitutional: Negative.    HENT: Negative.    Eyes: Negative.    Respiratory: Negative.    Cardiovascular: Negative.    Gastrointestinal: Negative.    Endocrine: Negative.    Genitourinary: Negative.    Musculoskeletal: Negative.  Skin: Negative.    Allergic/Immunologic: Negative.    Neurological: Negative.    Hematological: Negative.    Psychiatric/Behavioral: Negative.    All other systems reviewed and are negative.          PHYSICAL EXAM    Vitals:    01/23/19 1025   BP: 98/60   Pulse: 60   Resp: 16   SpO2: 99%   Weight: 66.4 kg (146 lb 4.8 oz)   Height: 165.1 cm (65\")     GENERAL: alert and oriented, afebrile and vital signs stable  HEENT: oral mucosa moist, PEERLA, EOM, conjunctiva normal  No cervical adenopathy  LUNGS: clear to ascultation bilaterally, no rales, ronchi or wheezing  HEART: RRR S1 S2 without murmers, thrills, rubs or gallops  CHEST WALL: within normal limits, no tenderness  ABDOMEN: WNL. Normal BS.  EXTREMITIES: No clubbing, cyanosis or edema noted. Normal Pulses.  SKIN: warm, dry, no rashes noted  NEURO: CN II- XII grossly intact    ASSESSMENT AND PLAN  Problem List Items Addressed This Visit     None      Visit Diagnoses     Routine general medical examination at a health care facility    -  Primary        Routine health maintenance reviewed and discussed with Eileen.  Labs reviewed with patient and on chart.  .No orders of the defined types were placed in this encounter.    Return in about 1 year (around 1/23/2020) for Annual physical.  "

## 2019-03-13 ENCOUNTER — TELEPHONE (OUTPATIENT)
Dept: OBSTETRICS AND GYNECOLOGY | Age: 44
End: 2019-03-13

## 2019-05-10 ENCOUNTER — TELEPHONE (OUTPATIENT)
Dept: OBSTETRICS AND GYNECOLOGY | Age: 44
End: 2019-05-10

## 2019-05-10 NOTE — TELEPHONE ENCOUNTER
Dr Orta pt called to r/s both her annual exam & mammogram. Pt states she has been rescheduled several times but this time its because she will now be out of town working 5/14/19. She is requesting to have both AE & MG on the same day yet declined next available MG as she will also be working & rescheduled MG to 7/16/19. Pt is now requesting to be rescheduled for her AE either the morning of Tues 5/28/19 or anytime Tues 6/4/19 and only wants to see Dr Orta. Please advise.

## 2019-06-12 ENCOUNTER — APPOINTMENT (OUTPATIENT)
Dept: WOMENS IMAGING | Facility: HOSPITAL | Age: 44
End: 2019-06-12

## 2019-06-12 ENCOUNTER — PROCEDURE VISIT (OUTPATIENT)
Dept: OBSTETRICS AND GYNECOLOGY | Age: 44
End: 2019-06-12

## 2019-06-12 DIAGNOSIS — Z12.31 VISIT FOR SCREENING MAMMOGRAM: Primary | ICD-10-CM

## 2019-06-12 PROCEDURE — 77067 SCR MAMMO BI INCL CAD: CPT | Performed by: OBSTETRICS & GYNECOLOGY

## 2019-06-12 PROCEDURE — 77067 SCR MAMMO BI INCL CAD: CPT | Performed by: RADIOLOGY

## 2019-06-17 ENCOUNTER — TELEPHONE (OUTPATIENT)
Dept: OBSTETRICS AND GYNECOLOGY | Age: 44
End: 2019-06-17

## 2019-06-17 DIAGNOSIS — R92.8 ABNORMAL MAMMOGRAM: Primary | ICD-10-CM

## 2019-06-18 ENCOUNTER — TELEPHONE (OUTPATIENT)
Dept: OBSTETRICS AND GYNECOLOGY | Age: 44
End: 2019-06-18

## 2019-06-20 ENCOUNTER — DOCUMENTATION (OUTPATIENT)
Dept: OBSTETRICS AND GYNECOLOGY | Age: 44
End: 2019-06-20

## 2019-06-24 ENCOUNTER — APPOINTMENT (OUTPATIENT)
Dept: WOMENS IMAGING | Facility: HOSPITAL | Age: 44
End: 2019-06-24

## 2019-06-24 PROCEDURE — 77065 DX MAMMO INCL CAD UNI: CPT | Performed by: RADIOLOGY

## 2019-06-24 PROCEDURE — 76641 ULTRASOUND BREAST COMPLETE: CPT | Performed by: RADIOLOGY

## 2019-06-24 PROCEDURE — 77061 BREAST TOMOSYNTHESIS UNI: CPT | Performed by: RADIOLOGY

## 2019-06-24 PROCEDURE — G0279 TOMOSYNTHESIS, MAMMO: HCPCS | Performed by: RADIOLOGY

## 2019-06-28 ENCOUNTER — TELEPHONE (OUTPATIENT)
Dept: OBSTETRICS AND GYNECOLOGY | Age: 44
End: 2019-06-28

## 2019-06-28 NOTE — TELEPHONE ENCOUNTER
Dr SEVERINO leon (aware will not see till Monday) wishes to speak with Dr HAQ re: her mg/us report, pt is upset with [facility] sending a letter stating the results is PROBABLY benign and to fu w/US in 6 mos. I did fax a copy of the report to the pt

## 2019-07-03 ENCOUNTER — TELEPHONE (OUTPATIENT)
Dept: OBSTETRICS AND GYNECOLOGY | Age: 44
End: 2019-07-03

## 2019-07-09 ENCOUNTER — TELEPHONE (OUTPATIENT)
Dept: OBSTETRICS AND GYNECOLOGY | Age: 44
End: 2019-07-09

## 2019-07-09 DIAGNOSIS — N60.02 SOLITARY CYST OF LEFT BREAST: Primary | ICD-10-CM

## 2019-07-09 NOTE — TELEPHONE ENCOUNTER
Reviewed imaging with pt , offered breast surgery consult, repeat in 3-4 months , order biopsy right off or stick with radiology rec of repeat in 6 months  Pt prefers 3 month follow up , will schedule

## 2019-09-13 ENCOUNTER — APPOINTMENT (OUTPATIENT)
Dept: WOMENS IMAGING | Facility: HOSPITAL | Age: 44
End: 2019-09-13

## 2019-09-13 PROCEDURE — 76641 ULTRASOUND BREAST COMPLETE: CPT | Performed by: RADIOLOGY

## 2020-02-21 ENCOUNTER — OFFICE VISIT (OUTPATIENT)
Dept: FAMILY MEDICINE CLINIC | Facility: CLINIC | Age: 45
End: 2020-02-21

## 2020-02-21 VITALS
WEIGHT: 148 LBS | BODY MASS INDEX: 24.63 KG/M2 | HEART RATE: 72 BPM | RESPIRATION RATE: 16 BRPM | OXYGEN SATURATION: 99 % | SYSTOLIC BLOOD PRESSURE: 100 MMHG | DIASTOLIC BLOOD PRESSURE: 66 MMHG

## 2020-02-21 DIAGNOSIS — J01.00 ACUTE NON-RECURRENT MAXILLARY SINUSITIS: Primary | ICD-10-CM

## 2020-02-21 DIAGNOSIS — R42 VERTIGO: ICD-10-CM

## 2020-02-21 PROCEDURE — 99213 OFFICE O/P EST LOW 20 MIN: CPT | Performed by: FAMILY MEDICINE

## 2020-02-21 RX ORDER — AMOXICILLIN 500 MG/1
TABLET, FILM COATED ORAL
Qty: 60 TABLET | Refills: 0 | Status: SHIPPED | OUTPATIENT
Start: 2020-02-21 | End: 2020-03-11

## 2020-02-21 NOTE — PATIENT INSTRUCTIONS
You have an acute maxillary sinusitis. The first line treatment for this is an antibiotic, Amoxil. You have been prescribed Amoxil 500 mg , 2 tabs, three times a day, for 10 days. Make sure you take all of this antibiotic.    To help with your symptoms:    In general :   Use over the counter ( OTC) medications only until symptoms resolve.  Buy generics - they work just as well and for less money.  Try to find single ingredient products.  Increase fluids to 8 - 10 glasses of non-caffeine beverages a day.    For congestion and headache - Sudafed, phenylephrine, Afrin nasal spray and  ibuprofen, acetaminophen, naproxen or aspirin.  Hot, steamy showers work, too.    For cough - Guaifenisen ( Mucinex, Robitussin or Humabid) or Delsym.    For sore throat - cough drops, honey  and warm salt water gargles.    If you are not better in a week or if you get worse, call us.

## 2020-02-21 NOTE — PROGRESS NOTES
Subjective   Eileen Johnston is a 44 y.o. female.     History of Present Illness   Eileen is here w/ uri sx x 1 week.  She states she began losing her voice first.  She states her ears are congested, sinus pain pressure.  She is having a productive cough.  She states last month she had some episodes of vertigo. 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  She has taken Dayquil a few times.    The following portions of the patient's history were reviewed and updated as appropriate: allergies, current medications, past family history, past medical history, past social history, past surgical history and problem list.    Review of Systems   Constitutional: Positive for fatigue. Negative for chills and fever.   HENT: Positive for congestion, ear pain, sinus pressure, sore throat, swollen glands and voice change.    Respiratory: Positive for cough. Negative for shortness of breath and wheezing.    Gastrointestinal: Negative for diarrhea, nausea and vomiting.   Neurological: Negative for headache.   All other systems reviewed and are negative.      Objective   Physical Exam   Constitutional: She is oriented to person, place, and time. She appears well-developed and well-nourished.   HENT:   Head: Normocephalic and atraumatic.   Nose: Nose normal.   Mouth/Throat: Oropharynx is clear and moist.   Eyes: Pupils are equal, round, and reactive to light. EOM are normal.   Neck: Normal range of motion. Neck supple.   Cardiovascular: Normal rate, regular rhythm and normal heart sounds.   No murmur heard.  Pulmonary/Chest: Effort normal and breath sounds normal. No respiratory distress.   Musculoskeletal: Normal range of motion.   Lymphadenopathy:     She has no cervical adenopathy.   Neurological: She is alert and oriented to person, place, and time.   Skin: Skin is warm and dry. No rash noted.   Nursing note and vitals reviewed.        Assessment/Plan   Problem List Items Addressed This Visit      None      Visit Diagnoses     Acute non-recurrent maxillary sinusitis    -  Primary    Relevant Medications    amoxicillin (AMOXIL) 500 MG tablet    Vertigo      I have encouraged her to call me if vertigo returns. I have advised aggressive decongesting when she has these symptoms to see if that helps        Patient was given instructions in the After Visit Summary regarding how to take prescribed medication, OTC medication and supportive care that will help with symptoms and when to call me.       Return if symptoms worsen or fail to improve.

## 2020-03-11 ENCOUNTER — OFFICE VISIT (OUTPATIENT)
Dept: OBSTETRICS AND GYNECOLOGY | Age: 45
End: 2020-03-11

## 2020-03-11 ENCOUNTER — TELEPHONE (OUTPATIENT)
Dept: OBSTETRICS AND GYNECOLOGY | Age: 45
End: 2020-03-11

## 2020-03-11 VITALS
HEIGHT: 65 IN | DIASTOLIC BLOOD PRESSURE: 64 MMHG | WEIGHT: 147 LBS | SYSTOLIC BLOOD PRESSURE: 104 MMHG | BODY MASS INDEX: 24.49 KG/M2

## 2020-03-11 DIAGNOSIS — Z12.4 SCREENING FOR CERVICAL CANCER: ICD-10-CM

## 2020-03-11 DIAGNOSIS — Z11.3 SCREEN FOR STD (SEXUALLY TRANSMITTED DISEASE): ICD-10-CM

## 2020-03-11 DIAGNOSIS — Z01.419 ENCOUNTER FOR GYNECOLOGICAL EXAMINATION (GENERAL) (ROUTINE) WITHOUT ABNORMAL FINDINGS: Primary | ICD-10-CM

## 2020-03-11 PROCEDURE — 99396 PREV VISIT EST AGE 40-64: CPT | Performed by: OBSTETRICS & GYNECOLOGY

## 2020-03-11 NOTE — PROGRESS NOTES
"Routine Annual Visit    3/11/2020    Patient: Eileen Johnston          MR#:[id number]      Chief Complaint   Patient presents with   • Gynecologic Exam     AE TODAY.  2018 PAP = NEG/NEG HPV (HX OF LGSIL YEARS AGO).  MG 2019 BREAST U/S 2019.  CONTRACEPTION = NONE.         History of Present Illness    44 y.o. female  who presents for annual exam.   Pt requests pap although not due  mammo due soon, pt says she has scheduled, in chart is appears she missed a follow up but she says she went  Sinus infection and some vertigo recently  Dating a man who lives in [facility] on same case \"[employer]\" as she is  Saw Dr Napoles regarding trying for pregnancy, would consider a donor egg  Cycles are regular, no issues          Patient's last menstrual period was 2020.  Obstetric History:  OB History        0    Para   0    Term   0       0    AB   0    Living   0       SAB   0    TAB   0    Ectopic   0    Molar   0    Multiple   0    Live Births   0               Menstrual History:     Patient's last menstrual period was 2020.       Sexual History:       ________________________________________  Patient Active Problem List   Diagnosis   • Graves disease   • Crushing injury of finger of right hand       Past Medical History:   Diagnosis Date   • Cervical dysplasia    • GRETCHEN I (cervical intraepithelial neoplasia I)    • Graves disease     followed by Dr. Joy   • LGSIL on Pap smear of cervix    • Urinary tract infection     not for several years       Past Surgical History:   Procedure Laterality Date   • COLPOSCOPY W/ BIOPSY / CURETTAGE     • MOUTH SURGERY         Social History     Tobacco Use   Smoking Status Never Smoker   Smokeless Tobacco Never Used       currently has no medications in their medication list.  ________________________________________    Current contraception: none  History of abnormal Pap smear: yes - in past, last was normal 2018  Family history of Breast " "cancer: no  Family history of uterine or ovarian cancer: no  Family History of colon cancer/colon polyps: no  History of abnormal mammogram: no      The following portions of the patient's history were reviewed and updated as appropriate: allergies, current medications, past family history, past medical history, past social history, past surgical history and problem list.    Review of Systems    Pertinent items are noted in HPI.     Objective   Physical Exam    /64   Ht 165.1 cm (65\")   Wt 66.7 kg (147 lb)   LMP 03/06/2020   Breastfeeding No   BMI 24.46 kg/m²    BP Readings from Last 3 Encounters:   03/11/20 104/64   02/21/20 100/66   01/23/19 98/60      Wt Readings from Last 3 Encounters:   03/11/20 66.7 kg (147 lb)   02/21/20 67.1 kg (148 lb)   01/23/19 66.4 kg (146 lb 4.8 oz)      BMI: Estimated body mass index is 24.46 kg/m² as calculated from the following:    Height as of this encounter: 165.1 cm (65\").    Weight as of this encounter: 66.7 kg (147 lb).      General:   alert, appears stated age and cooperative   Abdomen: soft, non-tender, without masses or organomegaly   Breast: inspection negative, no nipple discharge or bleeding, no masses or nodularity palpable   Vulva: normal   Vagina: normal mucosa   Cervix: no cervical motion tenderness and no lesions   Uterus: normal size, mobile or non-tender   Adnexa: no mass, fullness, tenderness     Assessment:    1. Normal annual exam   Assessment     ICD-10-CM ICD-9-CM   1. Encounter for gynecological examination (general) (routine) without abnormal findings Z01.419 V72.31   2. Screening for cervical cancer Z12.4 V76.2   3. Screen for STD (sexually transmitted disease) Z11.3 V74.5     Plan:    Plan     [x]  Mammogram request made  [x]  PAP done  []  Labs:   []  GC/Chl/TV  []  DEXA scan   []  Referral for colonoscopy:       Eileen was seen today for gynecologic exam.    Diagnoses and all orders for this visit:    Encounter for gynecological examination " (general) (routine) without abnormal findings    Screening for cervical cancer  -     IGP, Apt HPV,rfx 16 / 18,45    Screen for STD (sexually transmitted disease)  -     IGP, Apt HPV,rfx 16 / 18,45            Counseling:  --Nutrition: Stressed importance of moderation and caloric balance, stressed fresh fruit and vegetables  --Exercise: Stressed the importance of regular exercise. 3-5 times weekly   - Discussed screening mammogram recommendations.   --Discussed benefits of screening colonoscopy- age 45 unless FH  --Discussed pap smear screening recommendations

## 2020-03-11 NOTE — PROGRESS NOTES
Everything that I see from [facility] is scanned in Epic. Does she know the date it was done?

## 2020-03-16 LAB
CYTOLOGIST CVX/VAG CYTO: ABNORMAL
CYTOLOGY CVX/VAG DOC CYTO: ABNORMAL
CYTOLOGY CVX/VAG DOC THIN PREP: ABNORMAL
DX ICD CODE: ABNORMAL
DX ICD CODE: ABNORMAL
HIV 1 & 2 AB SER-IMP: ABNORMAL
HPV I/H RISK 4 DNA CVX QL PROBE+SIG AMP: POSITIVE
HPV16 DNA CVX QL PROBE+SIG AMP: NEGATIVE
HPV18+45 E6+E7 MRNA CVX QL NAA+PROBE: NEGATIVE
OTHER STN SPEC: ABNORMAL
PATHOLOGIST CVX/VAG CYTO: ABNORMAL
RECOM F/U CVX/VAG CYTO: ABNORMAL
STAT OF ADQ CVX/VAG CYTO-IMP: ABNORMAL

## 2020-03-17 ENCOUNTER — TELEPHONE (OUTPATIENT)
Dept: OBSTETRICS AND GYNECOLOGY | Age: 45
End: 2020-03-17

## 2020-03-17 NOTE — TELEPHONE ENCOUNTER
I notified mild abn with hpv pos but not 16,18,45   Recommend colpo within a few months, not urgent,       Please schedule early June

## 2020-07-08 ENCOUNTER — APPOINTMENT (OUTPATIENT)
Dept: WOMENS IMAGING | Facility: HOSPITAL | Age: 45
End: 2020-07-08

## 2020-07-08 PROCEDURE — 76641 ULTRASOUND BREAST COMPLETE: CPT | Performed by: RADIOLOGY

## 2020-07-08 PROCEDURE — 77066 DX MAMMO INCL CAD BI: CPT | Performed by: RADIOLOGY

## 2020-07-08 PROCEDURE — 77062 BREAST TOMOSYNTHESIS BI: CPT | Performed by: RADIOLOGY

## 2020-07-08 PROCEDURE — G0279 TOMOSYNTHESIS, MAMMO: HCPCS | Performed by: RADIOLOGY

## 2020-07-09 ENCOUNTER — TELEPHONE (OUTPATIENT)
Dept: OBSTETRICS AND GYNECOLOGY | Age: 45
End: 2020-07-09

## 2020-07-09 DIAGNOSIS — N63.0 BREAST MASS IN FEMALE: Primary | ICD-10-CM

## 2020-07-09 NOTE — TELEPHONE ENCOUNTER
Spoke with pt about report and biopsies ordered for left breast, pt has scheduled, reviewed report with pt

## 2020-07-09 NOTE — TELEPHONE ENCOUNTER
Haylie from [facility] called on behalf of this patient. Stated that she has two left breat masses  And needs an ultrasound guided vacuum assisted breast biopsy times two. Please advise

## 2020-07-09 NOTE — TELEPHONE ENCOUNTER
SCHEDULED TUES 7/14 11AM [facility].    PLEASE CALL PATIENT TO DISCUSS - SHE WANTS TO SPEAK WITH YOU PERSONALLY.

## 2020-07-13 ENCOUNTER — TELEPHONE (OUTPATIENT)
Dept: OBSTETRICS AND GYNECOLOGY | Age: 45
End: 2020-07-13

## 2020-07-13 NOTE — TELEPHONE ENCOUNTER
----- Message from Eileen Johnston sent at 7/13/2020  3:52 PM EDT -----  Regarding: Visit Follow-Up Question  Contact: [phone]  What time is my biopsy on Tuesday?  Thanks.

## 2020-07-14 ENCOUNTER — APPOINTMENT (OUTPATIENT)
Dept: WOMENS IMAGING | Facility: HOSPITAL | Age: 45
End: 2020-07-14

## 2020-07-14 PROCEDURE — 19084 BX BREAST ADD LESION US IMAG: CPT | Performed by: RADIOLOGY

## 2020-07-14 PROCEDURE — 19083 BX BREAST 1ST LESION US IMAG: CPT | Performed by: RADIOLOGY

## 2020-07-30 ENCOUNTER — TELEPHONE (OUTPATIENT)
Dept: OBSTETRICS AND GYNECOLOGY | Age: 45
End: 2020-07-30

## 2020-07-30 NOTE — TELEPHONE ENCOUNTER
Pt called, has Diogenes scheduled for Monday 8/3.  Pt just learned she has a court hearing Monday and wanted to know if she could reschedule for 9/28 (your next available procedure spot)?    Please advise.    [phone]

## 2020-08-31 ENCOUNTER — TELEPHONE (OUTPATIENT)
Dept: FAMILY MEDICINE CLINIC | Facility: CLINIC | Age: 45
End: 2020-08-31

## 2020-09-28 ENCOUNTER — OFFICE VISIT (OUTPATIENT)
Dept: OBSTETRICS AND GYNECOLOGY | Age: 45
End: 2020-09-28

## 2020-09-28 VITALS
BODY MASS INDEX: 25.19 KG/M2 | DIASTOLIC BLOOD PRESSURE: 74 MMHG | HEIGHT: 65 IN | SYSTOLIC BLOOD PRESSURE: 116 MMHG | WEIGHT: 151.2 LBS

## 2020-09-28 DIAGNOSIS — Z87.42 HX OF ABNORMAL CERVICAL PAP SMEAR: ICD-10-CM

## 2020-09-28 DIAGNOSIS — R87.610 ASCUS WITH POSITIVE HIGH RISK HPV CERVICAL: ICD-10-CM

## 2020-09-28 DIAGNOSIS — R87.810 ASCUS WITH POSITIVE HIGH RISK HPV CERVICAL: ICD-10-CM

## 2020-09-28 DIAGNOSIS — Z01.818 PREPROCEDURAL EXAMINATION: Primary | ICD-10-CM

## 2020-09-28 LAB
B-HCG UR QL: NEGATIVE
INTERNAL NEGATIVE CONTROL: NEGATIVE
INTERNAL POSITIVE CONTROL: POSITIVE
Lab: NORMAL

## 2020-09-28 PROCEDURE — 57454 BX/CURETT OF CERVIX W/SCOPE: CPT | Performed by: OBSTETRICS & GYNECOLOGY

## 2020-09-28 PROCEDURE — 81025 URINE PREGNANCY TEST: CPT | Performed by: OBSTETRICS & GYNECOLOGY

## 2020-09-28 NOTE — PROGRESS NOTES
"GYN Visit    2020    Patient: Eileen Johnston          MR#:[id number]      Chief Complaint   Patient presents with   • Procedure     Colpo today, Last AE 3/11/2020 w/pap mild abn w/HPV + but not 16,18,45       History of Present Illness    44 y.o. 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    ________________________________________  Patient Active Problem List   Diagnosis   • Graves disease   • Crushing injury of finger of right hand       Past Medical History:   Diagnosis Date   • Cancer (CMS/HCC)    • Cervical dysplasia    • Graves disease     followed by Dr. Joy   • LGSIL on Pap smear of cervix    • Urinary tract infection     not for several years       Past Surgical History:   Procedure Laterality Date   • COLPOSCOPY W/ BIOPSY / CURETTAGE     • MAMMO CORE NEEDLE BIOPSY UNILATERAL     • MOUTH SURGERY         Social History     Tobacco Use   Smoking Status Never Smoker   Smokeless Tobacco Never Used       currently has no medications in their medication list.  ________________________________________    Current contraception: none      The following portions of the patient's history were reviewed and updated as appropriate: allergies, current medications, past family history, past medical history, past social history, past surgical history and problem list.    Review of Systems    Pertinent items are noted in HPI.     Objective   Physical Exam    /74   Ht 165.1 cm (65\")   Wt 68.6 kg (151 lb 3.2 oz)   LMP 2020   Breastfeeding No   BMI 25.16 kg/m²    BP Readings from Last 3 Encounters:   20 116/74   20 104/64   20 100/66      Wt Readings from Last 3 Encounters:   20 68.6 kg (151 lb 3.2 oz)   20 66.7 kg (147 " "lb)   02/21/20 67.1 kg (148 lb)      BMI: Estimated body mass index is 25.16 kg/m² as calculated from the following:    Height as of this encounter: 165.1 cm (65\").    Weight as of this encounter: 68.6 kg (151 lb 3.2 oz).    Colposcopy Note    Chief Complaint   Patient presents with   • Procedure     Colpo today, Last AE 3/11/2020 w/pap mild abn w/HPV + but not 16,18,45       Verbal consent obtained, urine pregnancy test is negative    Pap results: ASCUS, HPV pos    History of cervical disease summary: previous leep over 10 years ago    BCM: none  Smoker?:      Speculum placed  Cervix swabbed with acetic acid solution  Transformation zone visulalized : 100 %  Findings : AWE especially 12 and 6 , thick, no pn or mo    Biopsies: brush 6,12    ECC: yes      Colposcopy of vagina and vulva normal and no lesions    monsels and pressure applied  Pt tolerated procedure well        ICD-10-CM ICD-9-CM   1. Preprocedural examination  Z01.818 V72.84   2. ASCUS with positive high risk HPV cervical  R87.610 795.01    R87.810 795.05   3. Hx of abnormal cervical Pap smear  Z87.42 V13.29       Impression:      Plan:  Follow up based on results  High grade results will perform leep  If low grade results continue to follow with paps  Repeat pap appt made in 6 months.      Assessment:      Eileen was seen today for procedure.    Diagnoses and all orders for this visit:    Preprocedural examination  -     POC Pregnancy, Urine    ASCUS with positive high risk HPV cervical  -     Colposcopy  -     IGP, Apt HPV,rfx 16 / 18,45  -     Reference Histopathology    Hx of abnormal cervical Pap smear  -     Reference Histopathology              "

## 2020-09-30 LAB
DX ICD CODE: NORMAL
PATH REPORT.FINAL DX SPEC: NORMAL
PATH REPORT.GROSS SPEC: NORMAL
PATH REPORT.RELEVANT HX SPEC: NORMAL
PATH REPORT.SITE OF ORIGIN SPEC: NORMAL
PATHOLOGIST NAME: NORMAL
PAYMENT PROCEDURE: NORMAL

## 2020-10-01 ENCOUNTER — TELEPHONE (OUTPATIENT)
Dept: OBSTETRICS AND GYNECOLOGY | Age: 45
End: 2020-10-01

## 2020-10-01 NOTE — TELEPHONE ENCOUNTER
----- Message from Stephanie Orta MD sent at 10/1/2020  6:28 AM EDT -----  Notify low grade changes, low grade dysplasia, we can follow this, schedule pap in 6 months as follow up

## 2020-10-07 ENCOUNTER — TELEPHONE (OUTPATIENT)
Dept: OBSTETRICS AND GYNECOLOGY | Age: 45
End: 2020-10-07

## 2020-10-07 NOTE — TELEPHONE ENCOUNTER
----- Message from Stephanie Orta MD sent at 10/7/2020  8:17 AM EDT -----  Notify pap matches biopsies, will follow with 6 month repeat pap

## 2020-11-04 ENCOUNTER — OFFICE VISIT (OUTPATIENT)
Dept: FAMILY MEDICINE CLINIC | Facility: CLINIC | Age: 45
End: 2020-11-04

## 2020-11-04 ENCOUNTER — RESULTS ENCOUNTER (OUTPATIENT)
Dept: FAMILY MEDICINE CLINIC | Facility: CLINIC | Age: 45
End: 2020-11-04

## 2020-11-04 VITALS
BODY MASS INDEX: 25.33 KG/M2 | HEART RATE: 78 BPM | OXYGEN SATURATION: 98 % | DIASTOLIC BLOOD PRESSURE: 70 MMHG | RESPIRATION RATE: 14 BRPM | WEIGHT: 152 LBS | TEMPERATURE: 97.3 F | SYSTOLIC BLOOD PRESSURE: 116 MMHG | HEIGHT: 65 IN

## 2020-11-04 DIAGNOSIS — Z12.11 COLON CANCER SCREENING: ICD-10-CM

## 2020-11-04 DIAGNOSIS — Z00.00 ROUTINE GENERAL MEDICAL EXAMINATION AT A HEALTH CARE FACILITY: Primary | ICD-10-CM

## 2020-11-04 PROCEDURE — 99396 PREV VISIT EST AGE 40-64: CPT | Performed by: FAMILY MEDICINE

## 2020-11-04 NOTE — PROGRESS NOTES
"Preventive Exam    History of Present Illness: Eileen is here for check up and review of routine health maintenance. She states she is doing well and we adressed the following :  She has had right elbow pain since September . Pain radiates down arm and is worse when she types. She has not tried OTC meds.   Pap Smear: followed  Gyn and will repeat in 6 months , HPV +   Mammogram:Will repeat in 6 months   Colon cancer screening:Cologuard ordered   STI screening:folloed by gyn  Tobacco use :non smoker      REVIEW OF SYSTEMS  Constitutional: Negative.    HENT: Negative.    Eyes: Negative.    Respiratory: Negative.    Cardiovascular: Negative.    Gastrointestinal: Negative.    Endocrine: Negative.    Genitourinary: Negative.    Musculoskeletal: Negative.  Skin: Negative.    Allergic/Immunologic: Negative.    Neurological: Negative.    Hematological: Negative.    Psychiatric/Behavioral: Negative.    I have reviewed the ROS as documented by the MA. Akshat Brooks MD       PHYSICAL EXAM    Vitals:    11/04/20 1012   BP: 116/70   Pulse: 78   Resp: 14   Temp: 97.3 °F (36.3 °C)   SpO2: 98%   Weight: 68.9 kg (152 lb)   Height: 165.1 cm (65\")     GENERAL: alert and oriented, afebrile and vital signs stable  HEENT: oral mucosa moist, PEERLA, EOM, conjunctiva normal  No cervical adenopathy  LUNGS: clear to ascultation bilaterally, no rales, ronchi or wheezing  HEART: RRR S1 S2 without murmers, thrills, rubs or gallops  CHEST WALL: within normal limits, no tenderness  ABDOMEN: WNL. Normal BS.  EXTREMITIES: No clubbing, cyanosis or edema noted. Normal Pulses.  SKIN: warm, dry, no rashes noted  NEURO: CN II- XII grossly intact  PSYCH: Good mood and positive affect    ASSESSMENT AND PLAN  Problem List Items Addressed This Visit     None      Visit Diagnoses     Routine general medical examination at a health care facility    -  Primary    Relevant Orders    CBC (No Diff)    Comprehensive Metabolic Panel    Lipid Panel With / " Chol / HDL Ratio    Colon cancer screening        Relevant Orders    Cologuard - Stool, Per Rectum        Routine health maintenance reviewed and discussed with Eileen.  The patient was counseled regarding a healthy diet and exercise, appropriate routine screening and immunizations and encouraged to get regular dental and eye exams .    No follow-ups on file.

## 2020-11-05 LAB
ALBUMIN SERPL-MCNC: 4.3 G/DL (ref 3.5–5.2)
ALBUMIN/GLOB SERPL: 1.6 G/DL
ALP SERPL-CCNC: 61 U/L (ref 39–117)
ALT SERPL-CCNC: 16 U/L (ref 1–33)
AST SERPL-CCNC: 20 U/L (ref 1–32)
BILIRUB SERPL-MCNC: 0.7 MG/DL (ref 0–1.2)
BUN SERPL-MCNC: 12 MG/DL (ref 6–20)
BUN/CREAT SERPL: 17.6 (ref 7–25)
CALCIUM SERPL-MCNC: 8.5 MG/DL (ref 8.6–10.5)
CHLORIDE SERPL-SCNC: 101 MMOL/L (ref 98–107)
CHOLEST SERPL-MCNC: 174 MG/DL (ref 0–200)
CHOLEST/HDLC SERPL: 2.45 {RATIO}
CO2 SERPL-SCNC: 26.5 MMOL/L (ref 22–29)
CREAT SERPL-MCNC: 0.68 MG/DL (ref 0.57–1)
ERYTHROCYTE [DISTWIDTH] IN BLOOD BY AUTOMATED COUNT: 12.6 % (ref 12.3–15.4)
GLOBULIN SER CALC-MCNC: 2.7 GM/DL
GLUCOSE SERPL-MCNC: 82 MG/DL (ref 65–99)
HCT VFR BLD AUTO: 42.2 % (ref 34–46.6)
HDLC SERPL-MCNC: 71 MG/DL (ref 40–60)
HGB BLD-MCNC: 13.6 G/DL (ref 12–15.9)
LDLC SERPL CALC-MCNC: 89 MG/DL (ref 0–100)
MCH RBC QN AUTO: 28.6 PG (ref 26.6–33)
MCHC RBC AUTO-ENTMCNC: 32.2 G/DL (ref 31.5–35.7)
MCV RBC AUTO: 88.7 FL (ref 79–97)
PLATELET # BLD AUTO: 255 10*3/MM3 (ref 140–450)
POTASSIUM SERPL-SCNC: 4.2 MMOL/L (ref 3.5–5.2)
PROT SERPL-MCNC: 7 G/DL (ref 6–8.5)
RBC # BLD AUTO: 4.76 10*6/MM3 (ref 3.77–5.28)
SODIUM SERPL-SCNC: 137 MMOL/L (ref 136–145)
TRIGL SERPL-MCNC: 77 MG/DL (ref 0–150)
VLDLC SERPL CALC-MCNC: 14 MG/DL (ref 5–40)
WBC # BLD AUTO: 4.91 10*3/MM3 (ref 3.4–10.8)

## 2020-11-05 NOTE — PATIENT INSTRUCTIONS
Annual Wellness  Personal Prevention Plan of Service     Date of Office Visit:  2020  Encounter Provider:  Akshat Brooks MD  Place of Service:  [facility]  Patient Name: Eileen Johnston  :  1975    As part of the Annual Wellness portion of your visit today, we are providing you with this personalized preventive plan of services (PPPS). This plan is based upon recommendations of the [place] Preventive Services Task Force (USPSTF) and the Advisory Committee on Immunization Practices (ACIP).    This lists the preventive care services that should be considered, and provides dates of when you are due. Items listed as completed are up-to-date and do not require any further intervention.    Health Maintenance   Topic Date Due   • COLONOSCOPY  1975   • HEPATITIS C SCREENING  07/15/2016   • INFLUENZA VACCINE  2021 (Originally 2020)   • ANNUAL PHYSICAL  2021   • PAP SMEAR  2023   • TDAP/TD VACCINES (3 - Td) 2027   • Pneumococcal Vaccine 0-64  Aged Out       Orders Placed This Encounter   Procedures   • Cologuard - Stool, Per Rectum     Standing Status:   Future     Standing Expiration Date:   2021   • CBC (No Diff)   • Comprehensive Metabolic Panel   • Lipid Panel With / Chol / HDL Ratio       Return in about 1 year (around 2021) for Annual physical.

## 2020-11-25 ENCOUNTER — TELEPHONE (OUTPATIENT)
Dept: FAMILY MEDICINE CLINIC | Facility: CLINIC | Age: 45
End: 2020-11-25

## 2020-11-25 DIAGNOSIS — Z20.822 CLOSE EXPOSURE TO COVID-19 VIRUS: Primary | ICD-10-CM

## 2020-11-25 NOTE — TELEPHONE ENCOUNTER
I spoke with Eileen today regarding concerns about a recent close exposure to someone with Covid.  She [place] her sister brother-in-law [place] and found out today that her brother had a positive Covid test.  Eileen has been in the same house with him since Saturday which means she has been in close contact with someone who has Covid for 5 days.  She has no symptoms.  I have advised her to go to [facility] to be tested for COVID-19 and I have encouraged her to maintain a self quarantine.  Based on these results I will call her and advise the next best things to do.  She has agreed to go to [facility].

## 2020-11-30 DIAGNOSIS — Z20.822 CLOSE EXPOSURE TO COVID-19 VIRUS: Primary | ICD-10-CM

## 2020-12-18 ENCOUNTER — CLINICAL SUPPORT (OUTPATIENT)
Dept: FAMILY MEDICINE CLINIC | Facility: CLINIC | Age: 45
End: 2020-12-18

## 2020-12-18 DIAGNOSIS — Z20.822 CLOSE EXPOSURE TO COVID-19 VIRUS: Primary | ICD-10-CM

## 2020-12-18 DIAGNOSIS — Z20.822 CLOSE EXPOSURE TO COVID-19 VIRUS: ICD-10-CM

## 2020-12-19 LAB — SARS-COV-2 RNA RESP QL NAA+PROBE: NOT DETECTED

## 2020-12-21 DIAGNOSIS — Z20.822 CLOSE EXPOSURE TO COVID-19 VIRUS: Primary | ICD-10-CM

## 2020-12-22 LAB — SARS-COV-2 RNA RESP QL NAA+PROBE: NOT DETECTED

## 2021-02-16 DIAGNOSIS — Z20.822 CLOSE EXPOSURE TO COVID-19 VIRUS: Primary | ICD-10-CM

## 2021-03-15 ENCOUNTER — OFFICE VISIT (OUTPATIENT)
Dept: OBSTETRICS AND GYNECOLOGY | Age: 46
End: 2021-03-15

## 2021-03-15 VITALS
BODY MASS INDEX: 25.49 KG/M2 | WEIGHT: 153 LBS | DIASTOLIC BLOOD PRESSURE: 64 MMHG | HEIGHT: 65 IN | SYSTOLIC BLOOD PRESSURE: 106 MMHG

## 2021-03-15 DIAGNOSIS — N60.02 BREAST CYST, LEFT: ICD-10-CM

## 2021-03-15 DIAGNOSIS — Z11.51 SCREENING FOR HPV (HUMAN PAPILLOMAVIRUS): ICD-10-CM

## 2021-03-15 DIAGNOSIS — Z12.4 PAP SMEAR FOR CERVICAL CANCER SCREENING: ICD-10-CM

## 2021-03-15 DIAGNOSIS — Z01.419 WELL WOMAN EXAM WITH ROUTINE GYNECOLOGICAL EXAM: Primary | ICD-10-CM

## 2021-03-15 DIAGNOSIS — Z12.31 ENCOUNTER FOR SCREENING MAMMOGRAM FOR MALIGNANT NEOPLASM OF BREAST: ICD-10-CM

## 2021-03-15 PROCEDURE — 99396 PREV VISIT EST AGE 40-64: CPT | Performed by: OBSTETRICS & GYNECOLOGY

## 2021-03-15 NOTE — PROGRESS NOTES
Routine Annual Visit    3/15/2021    Patient: Eileen Johnston          MR#:[id number]      Chief Complaint   Patient presents with   • Gynecologic Exam     AE today.  Pap  = ACUS, POS HPV, NEG 16, 18.  MG , 2020 and left diagnostic and biopsies = neg.  Not happy with [facility].  Contraception = none.   Cycles are regular.        History of Present Illness    45 y.o. female  who presents for annual exam.   Regular cycles  Working from home  [employer] on [address] , near parents  colpo in September, following mild dysplasia  Left breast US soon and bilateral mammo due in July  cologuard ordered by primary , pt hasnt done it yet  leep years ago and normal pap           Patient's last menstrual period was 2021.  Obstetric History:  OB History        0    Para   0    Term   0       0    AB   0    Living   0       SAB   0    TAB   0    Ectopic   0    Molar   0    Multiple   0    Live Births   0               Menstrual History:     Patient's last menstrual period was 2021.       Sexual History:       ________________________________________  Patient Active Problem List   Diagnosis   • Graves disease   • Crushing injury of finger of right hand       Past Medical History:   Diagnosis Date   • Cancer (CMS/HCC)    • Cervical dysplasia    • Graves disease     followed by Dr. Joy   • LGSIL on Pap smear of cervix    • Urinary tract infection     not for several years       Past Surgical History:   Procedure Laterality Date   • BREAST SURGERY Left     Titanium markers from bx   • COLPOSCOPY W/ BIOPSY / CURETTAGE     • MAMMO CORE NEEDLE BIOPSY UNILATERAL     • MOUTH SURGERY         Social History     Tobacco Use   Smoking Status Never Smoker   Smokeless Tobacco Never Used       currently has no medications in their medication list.  ________________________________________    Current contraception: condoms  History of abnormal Pap smear: yes - recent Low grade, following, previous remote  "leep  Family history of Breast cancer: no  Family history of uterine or ovarian cancer: no  Family History of colon cancer/colon polyps: no  History of abnormal mammogram: yes - recent negative biopsy      The following portions of the patient's history were reviewed and updated as appropriate: allergies, current medications, past family history, past medical history, past social history, past surgical history and problem list.    Review of Systems    Pertinent items are noted in HPI.     Objective   Physical Exam    /64   Ht 165.1 cm (65\")   Wt 69.4 kg (153 lb)   LMP 03/05/2021   Breastfeeding No   BMI 25.46 kg/m²    BP Readings from Last 3 Encounters:   03/15/21 106/64   11/04/20 116/70   09/28/20 116/74      Wt Readings from Last 3 Encounters:   03/15/21 69.4 kg (153 lb)   11/04/20 68.9 kg (152 lb)   09/28/20 68.6 kg (151 lb 3.2 oz)      BMI: Estimated body mass index is 25.46 kg/m² as calculated from the following:    Height as of this encounter: 165.1 cm (65\").    Weight as of this encounter: 69.4 kg (153 lb).      General:   alert, appears stated age and cooperative   Abdomen: soft, non-tender, without masses or organomegaly   Breast: inspection negative, no nipple discharge or bleeding, no masses or nodularity palpable   Vulva: normal   Vagina: normal mucosa   Cervix: no cervical motion tenderness and no lesions   Uterus: normal size, mobile or non-tender   Adnexa: no mass, fullness, tenderness     Assessment:    1. Normal annual exam   Assessment     ICD-10-CM ICD-9-CM   1. Well woman exam with routine gynecological exam  Z01.419 V72.31   2. Pap smear for cervical cancer screening  Z12.4 V76.2   3. Screening for HPV (human papillomavirus)  Z11.51 V73.81   4. Breast cyst, left  N60.02 610.0   5. Encounter for screening mammogram for malignant neoplasm of breast  Z12.31 V76.12     Plan:    Plan     [x]  Mammogram request made  [x]  PAP done  []  Labs:   []  GC/Chl/TV  []  DEXA scan   []  Referral " for colonoscopy:       Diagnoses and all orders for this visit:    1. Well woman exam with routine gynecological exam (Primary)    2. Pap smear for cervical cancer screening  -     IGP, Apt HPV,rfx 16 / 18,45    3. Screening for HPV (human papillomavirus)  -     IGP, Apt HPV,rfx 16 / 18,45    4. Breast cyst, left  -     US Breast Left Complete; Future    5. Encounter for screening mammogram for malignant neoplasm of breast  -     Mammo screening digital tomosynthesis bilateral w CAD; Future      Encouraged pt to do the cologuard ordered and kit at home      Counseling:  --Nutrition: Stressed importance of moderation and caloric balance, stressed fresh fruit and vegetables  --Exercise: Stressed the importance of regular exercise. 3-5 times weekly   - Discussed screening mammogram recommendations.   --Discussed benefits of screening colonoscopy- age 45 unless FH  --Discussed pap smear screening recommendations

## 2021-04-02 ENCOUNTER — BULK ORDERING (OUTPATIENT)
Dept: CASE MANAGEMENT | Facility: OTHER | Age: 46
End: 2021-04-02

## 2021-04-02 DIAGNOSIS — Z23 IMMUNIZATION DUE: ICD-10-CM

## 2021-04-15 ENCOUNTER — APPOINTMENT (OUTPATIENT)
Dept: WOMENS IMAGING | Facility: HOSPITAL | Age: 46
End: 2021-04-15

## 2021-04-15 PROCEDURE — 76641 ULTRASOUND BREAST COMPLETE: CPT | Performed by: RADIOLOGY

## 2021-04-20 ENCOUNTER — TELEPHONE (OUTPATIENT)
Dept: OBSTETRICS AND GYNECOLOGY | Age: 46
End: 2021-04-20

## 2021-04-20 NOTE — TELEPHONE ENCOUNTER
Pt is returning your call, I don't see any notes explaining why you called her, please call her when you can.    Thanks,     Anamaria

## 2021-04-20 NOTE — TELEPHONE ENCOUNTER
Millicent,    I figured out it was for her benign breast imaging, she is aware, and has her screening Mammogram scheduled.    Thanks,     Anamaria

## 2021-07-13 ENCOUNTER — APPOINTMENT (OUTPATIENT)
Dept: WOMENS IMAGING | Facility: HOSPITAL | Age: 46
End: 2021-07-13

## 2021-07-13 PROCEDURE — 77067 SCR MAMMO BI INCL CAD: CPT | Performed by: RADIOLOGY

## 2021-07-13 PROCEDURE — 77063 BREAST TOMOSYNTHESIS BI: CPT | Performed by: RADIOLOGY

## 2021-09-20 ENCOUNTER — OFFICE VISIT (OUTPATIENT)
Dept: OBSTETRICS AND GYNECOLOGY | Age: 46
End: 2021-09-20

## 2021-09-20 VITALS
HEIGHT: 65 IN | BODY MASS INDEX: 26.16 KG/M2 | WEIGHT: 157 LBS | SYSTOLIC BLOOD PRESSURE: 116 MMHG | DIASTOLIC BLOOD PRESSURE: 72 MMHG

## 2021-09-20 DIAGNOSIS — R87.612 LGSIL ON PAP SMEAR OF CERVIX: Primary | ICD-10-CM

## 2021-09-20 PROCEDURE — 99212 OFFICE O/P EST SF 10 MIN: CPT | Performed by: OBSTETRICS & GYNECOLOGY

## 2021-09-20 NOTE — PROGRESS NOTES
"GYN Visit    2021    Patient: Eileen Johnston          MR#:[id number]      Chief Complaint   Patient presents with   • Follow-up     repeat pap (last pap 3/2021 LSIL HPV+)       History of Present Illness    45 y.o. female  who presents for  Repeat pap  Following mild dysplasia  colpo in 2020  Last pap still LG  Pt without complaints, feels well         Patient's last menstrual period was 2021.    ________________________________________  Patient Active Problem List   Diagnosis   • Graves disease   • Crushing injury of finger of right hand       Past Medical History:   Diagnosis Date   • Cancer (CMS/HCC)    • Cervical dysplasia    • Graves disease     followed by Dr. Joy   • LGSIL on Pap smear of cervix    • Urinary tract infection     not for several years       Past Surgical History:   Procedure Laterality Date   • BREAST SURGERY Left     Titanium markers from bx   • COLPOSCOPY W/ BIOPSY / CURETTAGE     • MAMMO CORE NEEDLE BIOPSY UNILATERAL     • MOUTH SURGERY         Social History     Tobacco Use   Smoking Status Never Smoker   Smokeless Tobacco Never Used       currently has no medications in their medication list.  ________________________________________    Current contraception: none      The following portions of the patient's history were reviewed and updated as appropriate: allergies, current medications, past family history, past medical history, past social history, past surgical history and problem list.    Review of Systems   Genitourinary: Negative for menstrual problem and pelvic pain.   Psychiatric/Behavioral: Negative for dysphoric mood.   All other systems reviewed and are negative.      Pertinent items are noted in HPI.     Objective   Physical Exam    /72   Ht 165.1 cm (65\")   Wt 71.2 kg (157 lb)   LMP 2021   Breastfeeding No   BMI 26.13 kg/m²    BP Readings from Last 3 Encounters:   21 116/72   03/15/21 106/64   20 116/70      Wt Readings from " "Last 3 Encounters:   09/20/21 71.2 kg (157 lb)   03/15/21 69.4 kg (153 lb)   11/04/20 68.9 kg (152 lb)      BMI: Estimated body mass index is 26.13 kg/m² as calculated from the following:    Height as of this encounter: 165.1 cm (65\").    Weight as of this encounter: 71.2 kg (157 lb).    Lungs: non labored breathing, no wheezing or tachpnea  Extremities: extremities normal, atraumatic, no cyanosis or edema  Skin: Skin color, texture, turgor normal. No rashes or lesions  Neurologic: Grossly normal  General:   alert, appears stated age and cooperative   Abdomen: soft, non-tender, without masses or organomegaly       Vulva: normal   Vagina: normal mucosa   Cervix: no bleeding following Pap, no cervical motion tenderness and no lesions   Uterus: deferred   Adnexa: deferred     Assessment:      Diagnoses and all orders for this visit:    1. LGSIL on Pap smear of cervix (Primary)  -     IGP, Apt HPV,rfx 16 / 18,45              "

## 2021-09-24 ENCOUNTER — TELEPHONE (OUTPATIENT)
Dept: OBSTETRICS AND GYNECOLOGY | Age: 46
End: 2021-09-24

## 2021-09-24 LAB
CYTOLOGIST CVX/VAG CYTO: ABNORMAL
CYTOLOGY CVX/VAG DOC CYTO: ABNORMAL
CYTOLOGY CVX/VAG DOC THIN PREP: ABNORMAL
DX ICD CODE: ABNORMAL
DX ICD CODE: ABNORMAL
HIV 1 & 2 AB SER-IMP: ABNORMAL
HPV I/H RISK 4 DNA CVX QL PROBE+SIG AMP: POSITIVE
HPV16 DNA CVX QL PROBE+SIG AMP: NEGATIVE
HPV18+45 E6+E7 MRNA CVX QL NAA+PROBE: NEGATIVE
OTHER STN SPEC: ABNORMAL
PATHOLOGIST CVX/VAG CYTO: ABNORMAL
STAT OF ADQ CVX/VAG CYTO-IMP: ABNORMAL

## 2021-09-28 ENCOUNTER — TELEPHONE (OUTPATIENT)
Dept: OBSTETRICS AND GYNECOLOGY | Age: 46
End: 2021-09-28

## 2021-09-28 NOTE — TELEPHONE ENCOUNTER
Spoke with pt   Prefers to observe dysplasia going forward  Advised to complete cologuard ordered  Can repeat pap in 1 year  Considering IVF- discussed AMA etc

## 2022-03-14 ENCOUNTER — TELEPHONE (OUTPATIENT)
Dept: OBSTETRICS AND GYNECOLOGY | Age: 47
End: 2022-03-14

## 2022-03-15 NOTE — TELEPHONE ENCOUNTER
Per Ariella sage to book in telehealth spot on 06/23. LVM for pt to call back and confirm appt date/time, 06/23 at 3:45

## 2022-06-23 ENCOUNTER — OFFICE VISIT (OUTPATIENT)
Dept: OBSTETRICS AND GYNECOLOGY | Age: 47
End: 2022-06-23

## 2022-06-23 VITALS
SYSTOLIC BLOOD PRESSURE: 128 MMHG | WEIGHT: 140 LBS | HEIGHT: 65 IN | DIASTOLIC BLOOD PRESSURE: 76 MMHG | BODY MASS INDEX: 23.32 KG/M2

## 2022-06-23 DIAGNOSIS — Z12.4 SCREENING FOR CERVICAL CANCER: ICD-10-CM

## 2022-06-23 DIAGNOSIS — Z11.51 SCREENING FOR HPV (HUMAN PAPILLOMAVIRUS): ICD-10-CM

## 2022-06-23 DIAGNOSIS — Z12.11 SCREEN FOR COLON CANCER: ICD-10-CM

## 2022-06-23 DIAGNOSIS — Z12.31 ENCOUNTER FOR SCREENING MAMMOGRAM FOR MALIGNANT NEOPLASM OF BREAST: Primary | ICD-10-CM

## 2022-06-23 PROCEDURE — 99396 PREV VISIT EST AGE 40-64: CPT | Performed by: OBSTETRICS & GYNECOLOGY

## 2022-06-23 NOTE — PROGRESS NOTES
Routine Annual Visit    2022    Patient: Eileen Johnston          MR#:[id number]      Chief Complaint   Patient presents with   • Gynecologic Exam     Last Pap 21 (+), Last Mammo 21, Next Mammo Not Scheduled, No concerns at this time.       History of Present Illness    46 y.o. female  who presents for annual exam.     Reg menses no issues, no HF or NS  Sees Dr Napoles, may start process of IVF soon  Will speak with BF about it  Discussed abn pap, recommend colpo if abn still  Pt agreeable  Never did cologuard ,will order again      Patient's last menstrual period was 2022 (exact date).  Obstetric History:  OB History        0    Para   0    Term   0       0    AB   0    Living   0       SAB   0    IAB   0    Ectopic   0    Molar   0    Multiple   0    Live Births   0               Menstrual History:     Patient's last menstrual period was 2022 (exact date).       Sexual History:       ________________________________________  Patient Active Problem List   Diagnosis   • Graves disease   • Crushing injury of finger of right hand       Past Medical History:   Diagnosis Date   • Cancer (HCC)    • Cervical dysplasia    • Graves disease     followed by Dr. Joy   • HPV (human papilloma virus) infection    • LGSIL on Pap smear of cervix    • Urinary tract infection     not for several years       Past Surgical History:   Procedure Laterality Date   • BREAST SURGERY Left     Titanium markers from bx   • CERVICAL BIOPSY  W/ LOOP ELECTRODE EXCISION     • COLPOSCOPY W/ BIOPSY / CURETTAGE     • MAMMO CORE NEEDLE BIOPSY UNILATERAL     • MOUTH SURGERY     • WISDOM TOOTH EXTRACTION         Social History     Tobacco Use   Smoking Status Never Smoker   Smokeless Tobacco Never Used       currently has no medications in their medication list.  ________________________________________    Current contraception: none  History of abnormal Pap smear: yes - following mild dysplasia, leep in  "past  Family history of Breast cancer: no  Family history of uterine or ovarian cancer: no  Family History of colon cancer/colon polyps: no  History of abnormal mammogram: no      The following portions of the patient's history were reviewed and updated as appropriate: allergies, current medications, past family history, past medical history, past social history, past surgical history and problem list.    Review of Systems    Pertinent items are noted in HPI.     Objective   Physical Exam    /76   Ht 165.1 cm (65\")   Wt 63.5 kg (140 lb)   LMP 06/08/2022 (Exact Date)   Breastfeeding No   BMI 23.30 kg/m²    BP Readings from Last 3 Encounters:   06/23/22 128/76   09/20/21 116/72   03/15/21 106/64      Wt Readings from Last 3 Encounters:   06/23/22 63.5 kg (140 lb)   09/20/21 71.2 kg (157 lb)   03/15/21 69.4 kg (153 lb)      BMI: Estimated body mass index is 23.3 kg/m² as calculated from the following:    Height as of this encounter: 165.1 cm (65\").    Weight as of this encounter: 63.5 kg (140 lb).      General:   alert, appears stated age and cooperative   Abdomen: soft, non-tender, without masses or organomegaly   Breast: inspection negative, no nipple discharge or bleeding, no masses or nodularity palpable   Vulva: normal   Vagina: normal mucosa   Cervix: no cervical motion tenderness and no lesions   Uterus: normal size, mobile and non-tender   Adnexa: no mass, fullness, tenderness     Assessment:    1. Normal annual exam   Assessment     ICD-10-CM ICD-9-CM   1. Encounter for screening mammogram for malignant neoplasm of breast  Z12.31 V76.12   2. Screening for cervical cancer  Z12.4 V76.2   3. Screening for HPV (human papillomavirus)  Z11.51 V73.81   4. Screen for colon cancer  Z12.11 V76.51     Plan:    Plan     [x]  Mammogram request made  [x]  PAP done  []  Labs:   []  GC/Chl/TV  []  DEXA scan   []  Referral for colonoscopy:       Diagnoses and all orders for this visit:    1. Encounter for screening " mammogram for malignant neoplasm of breast (Primary)  -     Mammo screening digital tomosynthesis bilateral w CAD; Future    2. Screening for cervical cancer  -     IGP, Apt HPV,rfx 16 / 18,45    3. Screening for HPV (human papillomavirus)  -     IGP, Apt HPV,rfx 16 / 18,45    4. Screen for colon cancer  -     Cologuard - Stool, Per Rectum; Future      Consider colposcopy if abnormal      Counseling:  --Nutrition: Stressed importance of moderation and caloric balance, stressed fresh fruit and vegetables  --Exercise: Stressed the importance of regular exercise. 3-5 times weekly   - Discussed screening mammogram recommendations.   --Discussed benefits of screening colonoscopy- age 45 unless FH  --Discussed pap smear screening recommendations

## 2022-07-11 ENCOUNTER — TELEPHONE (OUTPATIENT)
Dept: OBSTETRICS AND GYNECOLOGY | Age: 47
End: 2022-07-11

## 2022-07-11 NOTE — TELEPHONE ENCOUNTER
OLIMPIA   Pt calls to schedule Colpo and has appointment on 09/12/22. Pt states she is typically unavailable Monday's due to going out of town most weekends. Pt was available 08/15/22 but Dr Orta is post call that day.

## 2022-07-14 ENCOUNTER — APPOINTMENT (OUTPATIENT)
Dept: WOMENS IMAGING | Facility: HOSPITAL | Age: 47
End: 2022-07-14

## 2022-07-14 PROCEDURE — 77063 BREAST TOMOSYNTHESIS BI: CPT | Performed by: RADIOLOGY

## 2022-07-14 PROCEDURE — 77067 SCR MAMMO BI INCL CAD: CPT | Performed by: RADIOLOGY

## 2022-11-28 ENCOUNTER — PROCEDURE VISIT (OUTPATIENT)
Dept: OBSTETRICS AND GYNECOLOGY | Age: 47
End: 2022-11-28

## 2022-11-28 VITALS
DIASTOLIC BLOOD PRESSURE: 68 MMHG | SYSTOLIC BLOOD PRESSURE: 120 MMHG | WEIGHT: 146.8 LBS | HEIGHT: 65 IN | BODY MASS INDEX: 24.46 KG/M2

## 2022-11-28 DIAGNOSIS — Z01.818 PREPROCEDURAL EXAMINATION: Primary | ICD-10-CM

## 2022-11-28 DIAGNOSIS — R87.610 ATYPICAL SQUAMOUS CELLS OF UNDETERMINED SIGNIFICANCE ON CYTOLOGIC SMEAR OF CERVIX (ASC-US): ICD-10-CM

## 2022-11-28 LAB
B-HCG UR QL: NEGATIVE
EXPIRATION DATE: NORMAL
INTERNAL NEGATIVE CONTROL: NORMAL
INTERNAL POSITIVE CONTROL: NORMAL
Lab: NORMAL

## 2022-11-28 PROCEDURE — 81025 URINE PREGNANCY TEST: CPT | Performed by: OBSTETRICS & GYNECOLOGY

## 2022-11-28 PROCEDURE — 57454 BX/CURETT OF CERVIX W/SCOPE: CPT | Performed by: OBSTETRICS & GYNECOLOGY

## 2022-11-28 RX ORDER — METHIMAZOLE 5 MG/1
2.5 TABLET ORAL DAILY
COMMUNITY
Start: 2022-11-21

## 2022-11-28 NOTE — PROGRESS NOTES
"GYN Visit    2022    Patient: Eileen Johnston          MR#:[id number]      Chief Complaint   Patient presents with   • Follow-up     Colpo today, Last AE 2022 w/pap ASCUS and HPV+       History of Present Illness    46 y.o. female  who presents for  colpo    Non smoker  Previous leep 20 years ago  colpo in - mild cat 1    ASCUS, HPV+, neg 16,18,45    Will try for pregnancy with RE        Patient's last menstrual period was 2022 (exact date).    ________________________________________  Patient Active Problem List   Diagnosis   • Graves disease   • Crushing injury of finger of right hand       Past Medical History:   Diagnosis Date   • Cancer (HCC)    • Cervical dysplasia    • Graves disease     followed by Dr. Joy   • HPV (human papilloma virus) infection    • LGSIL on Pap smear of cervix    • Urinary tract infection     not for several years       Past Surgical History:   Procedure Laterality Date   • BREAST SURGERY Left     Titanium markers from bx   • CERVICAL BIOPSY  W/ LOOP ELECTRODE EXCISION     • COLPOSCOPY W/ BIOPSY / CURETTAGE     • MAMMO CORE NEEDLE BIOPSY UNILATERAL     • MOUTH SURGERY     • WISDOM TOOTH EXTRACTION         Social History     Tobacco Use   Smoking Status Never   Smokeless Tobacco Never       has a current medication list which includes the following prescription(s): methimazole.  ________________________________________    Current contraception: none      The following portions of the patient's history were reviewed and updated as appropriate: allergies, current medications, past family history, past medical history, past social history, past surgical history and problem list.    Review of Systems    Pertinent items are noted in HPI.     Objective   Physical Exam    /68   Ht 165.1 cm (65\")   Wt 66.6 kg (146 lb 12.8 oz)   LMP 2022 (Exact Date)   Breastfeeding No   BMI 24.43 kg/m²    BP Readings from Last 3 Encounters:   22 120/68 " "  06/23/22 128/76   09/20/21 116/72      Wt Readings from Last 3 Encounters:   11/28/22 66.6 kg (146 lb 12.8 oz)   06/23/22 63.5 kg (140 lb)   09/20/21 71.2 kg (157 lb)      BMI: Estimated body mass index is 24.43 kg/m² as calculated from the following:    Height as of this encounter: 165.1 cm (65\").    Weight as of this encounter: 66.6 kg (146 lb 12.8 oz).    Colposcopy Procedure Note    Indications: Pap smear 5 months ago showed: ASCUS with POSITIVE high risk HPV. The prior pap showed no abnormalities.  Prior cervical/vaginal disease: GRETCHEN 1. Prior cervical treatment: remote history of leep, following mild changes due to desire for pregnancy in near future.    Procedure Details   The risks and benefits of the procedure and Written informed consent obtained.    Speculum placed in vagina and excellent visualization of cervix achieved, cervix swabbed x 3 with acetic acid solution.    Findings:  Cervix: no mosaicism, no punctation, no abnormal vasculature and acetowhite lesion(s) noted at 12 o'clock; endocervical curettage performed and cervical biopsies taken at 12 o'clock.  Vaginal inspection: normal without visible lesions.  Vulvar colposcopy: vulvar colposcopy not performed.    Specimens: brush biopsy at 12, ECC    Complications: none.  Patient tolerated the procedure well without complications.    Plan:  Specimens labelled and sent to Pathology.  Will base further treatment on Pathology findings.    Assessment:      Diagnoses and all orders for this visit:    1. Preprocedural examination (Primary)  -     POC Pregnancy, Urine    2. Atypical squamous cells of undetermined significance on cytologic smear of cervix (ASC-US)  -     Reference Histopathology  -     Colposcopy      AE in June as follow up   Would offer leep for high grade results, suspect low grade or benign        "

## 2022-11-30 LAB
DX ICD CODE: NORMAL
DX ICD CODE: NORMAL
PATH REPORT.FINAL DX SPEC: NORMAL
PATH REPORT.GROSS SPEC: NORMAL
PATH REPORT.SITE OF ORIGIN SPEC: NORMAL
PATHOLOGIST NAME: NORMAL
PAYMENT PROCEDURE: NORMAL

## 2022-12-02 ENCOUNTER — TELEPHONE (OUTPATIENT)
Dept: OBSTETRICS AND GYNECOLOGY | Age: 47
End: 2022-12-02

## 2022-12-02 NOTE — TELEPHONE ENCOUNTER
HPI     Diabetic Eye Exam      Additional comments: Yearly              Comments     Last seen by [facility] on 8/16/18 for yearly DM eye exam  Patient here today for yearly eye exam  Has noticeable change sin vision at near   Wears OTC readers  No other complaints  No drops  1. DM  2. NSC OU              Last edited by Malu Whitt, PCT on 8/19/2019  9:53 AM. (History)              Assessment /Plan     For exam results, see Encounter Report.    Type 2 diabetes mellitus with complication, unspecified whether long term insulin use    Cataract cortical, senile, bilateral    Rheumatoid arthritis involving multiple sites with positive rheumatoid factor    Essential hypertension    Glaucoma screening      Cataract(s) not yet affecting activities of daily living and does not meet the vision requirements for referral   Surgery consult is not yet indicated.  No diabetic or hypertensive retinopathy in either eye.  Glaucoma screening negative and fundus exam is normal  Stable refractive error.  Continue over the counter readers  Return to clinic 1 yr.                   
Had some bleeding , some BRB , now brown and sludge like  Likely post colpo related, reviewed GRETCHEN 1 path   Repeat pap 1 year  
No significant past surgical history

## 2022-12-08 DIAGNOSIS — Z31.69 PRE-CONCEPTION COUNSELING: Primary | ICD-10-CM

## 2022-12-13 DIAGNOSIS — Z82.49 FAMILY HISTORY OF CARDIAC DISORDER: Primary | ICD-10-CM

## 2022-12-30 ENCOUNTER — OFFICE VISIT (OUTPATIENT)
Dept: CARDIOLOGY | Facility: CLINIC | Age: 47
End: 2022-12-30

## 2022-12-30 VITALS
HEIGHT: 65 IN | DIASTOLIC BLOOD PRESSURE: 60 MMHG | SYSTOLIC BLOOD PRESSURE: 98 MMHG | WEIGHT: 146 LBS | HEART RATE: 65 BPM | BODY MASS INDEX: 24.32 KG/M2

## 2022-12-30 DIAGNOSIS — Z01.810 PREOPERATIVE CARDIOVASCULAR EXAMINATION: Primary | ICD-10-CM

## 2022-12-30 PROCEDURE — 93000 ELECTROCARDIOGRAM COMPLETE: CPT | Performed by: STUDENT IN AN ORGANIZED HEALTH CARE EDUCATION/TRAINING PROGRAM

## 2022-12-30 PROCEDURE — 99203 OFFICE O/P NEW LOW 30 MIN: CPT | Performed by: STUDENT IN AN ORGANIZED HEALTH CARE EDUCATION/TRAINING PROGRAM

## 2022-12-30 NOTE — PROGRESS NOTES
Subjective:     Encounter Date:12/30/2022      Patient ID: Eileen Johnston is a 47 y.o. female.    Chief Complaint:  Pre-pregnancy evaluation    HPI:   47 y.o. female with Graves' disease who presents for cardiac evaluation prior to pursuing pregnancy.  Patient has been in her usual state of health and has no complaints.  She denies any chest pain, dyspnea on exertion, lower extremity edema, palpitations.  She exercises on her Peloton every day and if unable to be on the bike she performs weightlifting or yoga.  During these activities she is asymptomatic.  With is back to her diet she is vegetarian and her protein sources are tofu and beans.  She does not have a history of rheumatic heart disease.      The following portions of the patient's history were reviewed and updated as appropriate: allergies, current medications, past family history, past medical history, past social history, past surgical history and problem list.     REVIEW OF SYSTEMS:   All systems reviewed.  Pertinent positives identified in HPI.  All other systems are negative.    Past Medical History:   Diagnosis Date   • Cervical dysplasia    • Graves disease     followed by Dr. Joy   • HPV (human papilloma virus) infection    • LGSIL on Pap smear of cervix    • Urinary tract infection     not for several years       Family History   Problem Relation Age of Onset   • Arthritis Mother    • Alcohol abuse Father         alcohol free for over 10 years   • Diabetes Father         no longer after losing 100 lbs post bypass   • Hyperlipidemia Father         no longer after losing 100 lbs following bypass   • Hypertension Father         no longer after losing 100 lbs following bypass   • Heart disease Father         bypass   • Coronary artery disease Father         Bypass (5)   • Kidney cancer Maternal Grandmother    • Cancer Maternal Grandmother         kidney cancer; kidney removed   • Hyperlipidemia Maternal Grandmother    • Hypertension Maternal  Grandmother    • Stroke Maternal Grandmother         cause of death at 92   • Thyroid disease Maternal Grandmother         hypothyroidism   • Cancer Maternal Grandfather         prostate cancer   • Prostate cancer Maternal Grandfather    • No Known Problems Paternal Grandmother    • Heart disease Paternal Grandfather         bypass   • Cancer Paternal Grandfather         skin cancer   • Diabetes Paternal Grandfather    • Coronary artery disease Paternal Grandfather         Bypass   • No Known Problems Maternal Aunt    • No Known Problems Paternal Aunt    • Miscarriages / Stillbirths Sister         2 miscarriages   • Thyroid disease Paternal Aunt         hypothyrodism   • BRCA 1/2 Neg Hx    • Breast cancer Neg Hx    • Colon cancer Neg Hx    • Endometrial cancer Neg Hx    • Ovarian cancer Neg Hx        Social History     Socioeconomic History   • Marital status: Single   • Number of children: 0   • Years of education: post   Tobacco Use   • Smoking status: Never   • Smokeless tobacco: Never   • Tobacco comments:     No caffeine use    Vaping Use   • Vaping Use: Never used   Substance and Sexual Activity   • Alcohol use: Yes     Alcohol/week: 3.0 standard drinks     Types: 3 Glasses of wine per week     Comment: 3-4/week   • Drug use: No   • Sexual activity: Not Currently     Partners: Male     Birth control/protection: Condom       No Known Allergies    Past Surgical History:   Procedure Laterality Date   • BREAST SURGERY Left     Titanium markers from bx   • CERVICAL BIOPSY  W/ LOOP ELECTRODE EXCISION     • COLPOSCOPY W/ BIOPSY / CURETTAGE     • MAMMO CORE NEEDLE BIOPSY UNILATERAL     • MOUTH SURGERY     • WISDOM TOOTH EXTRACTION           ECG 12 Lead    Date/Time: 12/30/2022 11:48 AM  Performed by: Obed Hua MD  Authorized by: Obed Hua MD   Comparison: not compared with previous ECG   Previous ECG: no previous ECG available  Rhythm: sinus rhythm  Rate: normal  Conduction: conduction normal  ST Segments: ST  segments normal  T Waves: T waves normal  QRS axis: normal  Other findings: early repolarization    Clinical impression: normal ECG               Objective:         Vitals:    12/30/22 1021   BP: 98/60   Pulse: 65       PHYSICAL EXAM:  GEN: well appearing, in NAD   HEENT: NCAT, EOMI, moist mucus membranes   Respiratory: CTAB, no wheezes, rales or rhonchi  CV: normal rate, regular rhythm, normal S1, S2, no murmurs, rubs, gallops, +2 radial pulses b/l  GI: soft, nontender, nondistended  MSK: no edema  Skin: no rash, warm, dry  Heme/Lymph: no bruising or bleeding  Psych: organized thought, normal behavior and affect  Neuro: Alert and Oriented x 3, grossly normal motor function        Assessment:         (Z01.810) Preoperative cardiovascular examination - Plan: Adult Transthoracic Echo Complete w/ Color, Spectral and Contrast if Necessary Per Protocol    47-year-old woman with Graves' who presents for cardiovascular examination prior to pursuing pregnancy.       Plan:       #Prepregnancy cardiovascular domination  Patient with minimal risk factors, exercises regularly, and can proceed with pregnancy.  She states that her [facility] provider requested echocardiogram for further assessment, which we will obtain.    Dr Orta,  thank you very much for referring this kind patient to me. Please call me with any questions or concerns. I will see the patient again in the office as needed.         Obed Hua MD  12/30/22  [facility]    Outpatient Encounter Medications as of 12/30/2022   Medication Sig Dispense Refill   • methIMAzole (TAPAZOLE) 5 MG tablet Take 2.5 mg by mouth Daily.       No facility-administered encounter medications on file as of 12/30/2022.

## 2023-01-06 ENCOUNTER — HOSPITAL ENCOUNTER (OUTPATIENT)
Dept: CARDIOLOGY | Facility: HOSPITAL | Age: 48
Discharge: HOME | End: 2023-01-06
Admitting: STUDENT IN AN ORGANIZED HEALTH CARE EDUCATION/TRAINING PROGRAM
Payer: COMMERCIAL

## 2023-01-06 ENCOUNTER — TELEMEDICINE (OUTPATIENT)
Dept: OBSTETRICS AND GYNECOLOGY | Facility: CLINIC | Age: 48
End: 2023-01-06
Payer: COMMERCIAL

## 2023-01-06 VITALS
DIASTOLIC BLOOD PRESSURE: 72 MMHG | BODY MASS INDEX: 24.32 KG/M2 | SYSTOLIC BLOOD PRESSURE: 112 MMHG | HEIGHT: 65 IN | HEART RATE: 67 BPM | WEIGHT: 145.94 LBS

## 2023-01-06 DIAGNOSIS — Z31.69 PRE-CONCEPTION COUNSELING: Primary | ICD-10-CM

## 2023-01-06 DIAGNOSIS — E05.00 GRAVES' DISEASE: ICD-10-CM

## 2023-01-06 DIAGNOSIS — Z01.810 PREOPERATIVE CARDIOVASCULAR EXAMINATION: ICD-10-CM

## 2023-01-06 LAB
AORTIC ARCH: 2.3 CM
ASCENDING AORTA: 2.8 CM
BH CV ECHO MEAS - AO MAX PG: 3.3 MMHG
BH CV ECHO MEAS - AO MEAN PG: 1.82 MMHG
BH CV ECHO MEAS - AO ROOT DIAM: 2.2 CM
BH CV ECHO MEAS - AO V2 MAX: 91.2 CM/SEC
BH CV ECHO MEAS - AO V2 VTI: 20.1 CM
BH CV ECHO MEAS - AVA(I,D): 2.6 CM2
BH CV ECHO MEAS - EDV(CUBED): 83.7 ML
BH CV ECHO MEAS - EDV(MOD-SP2): 86 ML
BH CV ECHO MEAS - EDV(MOD-SP4): 67 ML
BH CV ECHO MEAS - EF(MOD-BP): 58 %
BH CV ECHO MEAS - EF(MOD-SP2): 60.5 %
BH CV ECHO MEAS - EF(MOD-SP4): 55.2 %
BH CV ECHO MEAS - ESV(CUBED): 27.1 ML
BH CV ECHO MEAS - ESV(MOD-SP2): 34 ML
BH CV ECHO MEAS - ESV(MOD-SP4): 30 ML
BH CV ECHO MEAS - FS: 31.3 %
BH CV ECHO MEAS - IVS/LVPW: 1.01 CM
BH CV ECHO MEAS - IVSD: 0.7 CM
BH CV ECHO MEAS - LAT PEAK E' VEL: 9.4 CM/SEC
BH CV ECHO MEAS - LV MASS(C)D: 90.9 GRAMS
BH CV ECHO MEAS - LV MAX PG: 3 MMHG
BH CV ECHO MEAS - LV MEAN PG: 1.68 MMHG
BH CV ECHO MEAS - LV V1 MAX: 86.4 CM/SEC
BH CV ECHO MEAS - LV V1 VTI: 19.6 CM
BH CV ECHO MEAS - LVIDD: 4.4 CM
BH CV ECHO MEAS - LVIDS: 3 CM
BH CV ECHO MEAS - LVOT AREA: 2.7 CM2
BH CV ECHO MEAS - LVOT DIAM: 1.85 CM
BH CV ECHO MEAS - LVPWD: 0.69 CM
BH CV ECHO MEAS - MED PEAK E' VEL: 10.7 CM/SEC
BH CV ECHO MEAS - MV A DUR: 0.13 SEC
BH CV ECHO MEAS - MV A MAX VEL: 37.7 CM/SEC
BH CV ECHO MEAS - MV DEC SLOPE: 255.7 CM/SEC2
BH CV ECHO MEAS - MV DEC TIME: 0.2 MSEC
BH CV ECHO MEAS - MV E MAX VEL: 67.7 CM/SEC
BH CV ECHO MEAS - MV E/A: 1.8
BH CV ECHO MEAS - MV MAX PG: 2.03 MMHG
BH CV ECHO MEAS - MV MEAN PG: 1 MMHG
BH CV ECHO MEAS - MV P1/2T: 82.6 MSEC
BH CV ECHO MEAS - MV V2 VTI: 22.3 CM
BH CV ECHO MEAS - MVA(P1/2T): 2.7 CM2
BH CV ECHO MEAS - MVA(VTI): 2.35 CM2
BH CV ECHO MEAS - PA ACC TIME: 0.13 SEC
BH CV ECHO MEAS - PA PR(ACCEL): 22 MMHG
BH CV ECHO MEAS - PA V2 MAX: 61.7 CM/SEC
BH CV ECHO MEAS - PULM A REVS DUR: 0.18 SEC
BH CV ECHO MEAS - PULM DIAS VEL: 26.7 CM/SEC
BH CV ECHO MEAS - PULM S/D: 1.79
BH CV ECHO MEAS - PULM SYS VEL: 47.6 CM/SEC
BH CV ECHO MEAS - QP/QS: 0.57
BH CV ECHO MEAS - RAP SYSTOLE: 3 MMHG
BH CV ECHO MEAS - RV MAX PG: 0.83 MMHG
BH CV ECHO MEAS - RV V1 MAX: 45.4 CM/SEC
BH CV ECHO MEAS - RV V1 VTI: 9.6 CM
BH CV ECHO MEAS - RVOT DIAM: 1.98 CM
BH CV ECHO MEAS - SUP REN AO DIAM: 1.8 CM
BH CV ECHO MEAS - SV(LVOT): 52.4 ML
BH CV ECHO MEAS - SV(MOD-SP2): 52 ML
BH CV ECHO MEAS - SV(MOD-SP4): 37 ML
BH CV ECHO MEAS - SV(RVOT): 29.6 ML
BH CV ECHO MEAS - TAPSE (>1.6): 2.49 CM
BH CV ECHO MEASUREMENTS AVERAGE E/E' RATIO: 6.74
BH CV XLRA - RV BASE: 3.2 CM
BH CV XLRA - RV LENGTH: 6.8 CM
BH CV XLRA - RV MID: 3.1 CM
BH CV XLRA - TDI S': 9.2 CM/SEC
LEFT ATRIUM VOLUME INDEX: 33.7 ML/M2
MAXIMAL PREDICTED HEART RATE: 171 BPM
SINUS: 3.2 CM
STJ: 2.8 CM
STRESS TARGET HR: 145 BPM

## 2023-01-06 PROCEDURE — 93306 TTE W/DOPPLER COMPLETE: CPT

## 2023-01-06 PROCEDURE — 93306 TTE W/DOPPLER COMPLETE: CPT | Performed by: INTERNAL MEDICINE

## 2023-01-06 PROCEDURE — 99215 OFFICE O/P EST HI 40 MIN: CPT | Performed by: OBSTETRICS & GYNECOLOGY

## 2023-01-06 NOTE — LETTER
2023     Stephanie Orta MD  [address] [facility] [id number]    Patient: Eileen Johnston   YOB: 1975   Date of Visit: 2023       Dear Dr. Yuo MD:    Thank you for referring Eileen Johnston to me for evaluation. Below are the relevant portions of my assessment and plan of care.    If you have questions, please do not hesitate to call me. I look forward to following Eileen along with you.         Sincerely,        Martha Manuel MD        CC: No Recipients  Martha Manuel MD  23 1757  Incomplete  MATERNAL FETAL MEDICINE Consult Note    Dear Dr Stephanie Orta MD:    Thank you for your kind referral of Eileen Johnston.  As you know, she is a 47 y.o.   Here for preconception consult.      Her antepartum course is complicated by:  Graves Dz  AMA      HPI: Today, she denies headache, blurry vision, other issues.      Review of History:  Past Medical History:   Diagnosis Date   • Cervical dysplasia    • Graves disease     followed by Dr. Joy   • HPV (human papilloma virus) infection    • LGSIL on Pap smear of cervix    • Urinary tract infection     not for several years     Past Surgical History:   Procedure Laterality Date   • BREAST SURGERY Left     Titanium markers from bx   • CERVICAL BIOPSY  W/ LOOP ELECTRODE EXCISION     • COLPOSCOPY W/ BIOPSY / CURETTAGE     • MAMMO CORE NEEDLE BIOPSY UNILATERAL     • MOUTH SURGERY     • WISDOM TOOTH EXTRACTION         Social History     Socioeconomic History   • Marital status: Single   • Number of children: 0   • Years of education: post   Tobacco Use   • Smoking status: Never   • Smokeless tobacco: Never   • Tobacco comments:     No caffeine use    Vaping Use   • Vaping Use: Never used   Substance and Sexual Activity   • Alcohol use: Yes     Alcohol/week: 3.0 standard drinks     Types: 3 Glasses of wine per week     Comment: 3-4/week   • Drug use: No   • Sexual activity: Not Currently     Partners: Male     Birth  control/protection: Condom     Family History   Problem Relation Age of Onset   • Arthritis Mother    • Alcohol abuse Father         alcohol free for over 10 years   • Diabetes Father         no longer after losing 100 lbs post bypass   • Hyperlipidemia Father         no longer after losing 100 lbs following bypass   • Hypertension Father         no longer after losing 100 lbs following bypass   • Heart disease Father         bypass   • Coronary artery disease Father         Bypass (5)   • Kidney cancer Maternal Grandmother    • Cancer Maternal Grandmother         kidney cancer; kidney removed   • Hyperlipidemia Maternal Grandmother    • Hypertension Maternal Grandmother    • Stroke Maternal Grandmother         cause of death at 92   • Thyroid disease Maternal Grandmother         hypothyroidism   • Cancer Maternal Grandfather         prostate cancer   • Prostate cancer Maternal Grandfather    • No Known Problems Paternal Grandmother    • Heart disease Paternal Grandfather         bypass   • Cancer Paternal Grandfather         skin cancer   • Diabetes Paternal Grandfather    • Coronary artery disease Paternal Grandfather         Bypass   • No Known Problems Maternal Aunt    • No Known Problems Paternal Aunt    • Miscarriages / Stillbirths Sister         2 miscarriages   • Thyroid disease Paternal Aunt         hypothyrodism   • BRCA 1/2 Neg Hx    • Breast cancer Neg Hx    • Colon cancer Neg Hx    • Endometrial cancer Neg Hx    • Ovarian cancer Neg Hx       No Known Allergies   Current Outpatient Medications on File Prior to Visit   Medication Sig Dispense Refill   • methIMAzole (TAPAZOLE) 5 MG tablet Take 2.5 mg by mouth Daily.       No current facility-administered medications on file prior to visit.        Past obstetric, gynecological, medical, surgical, family and social history reviewed.  Relevant lab work and imaging reviewed.    Review of systems  Constitutional:  denies fever, chills, malaise.   ENT/Mouth:   denies sore throat, tinnitis  Eyes: denies vision changes/pain  CV:  denies chest pain  Respiratory:  denies cough/SOB  GI:  denies N/V, diarrhea, abdominal pain.    :   denies dysuria  Skin:  denies lesions or pruritis   Neuro:  denies weakness, focal neurologic symptoms    No vitals as this is a telehealth visit.      PHYSICAL EXAM   GENERAL: Not in acute distress, AAOx3, pleasant  No other physical exam as this was a telehealth visit.     ASSESSMENT/COUNSELIN y.o.   Here for preconception consult.          Diagnoses and all orders for this visit:    1. Pre-conception counseling (Primary)    2. Graves' disease    3. Advanced maternal age, primigravida, antepartum       Graves Disease:  Physiologic changes during pregnancy are considerable. Graves and hcg mediated hyperthyroidism are the most common causes of hyperthyroidism in pregnancy. Today I discussed the implications of hyperthyroidism in pregnancy. Hyperthyroidism is characterized by a decreased TSH level and an increased free FT4 level. The signs and symptoms of hyperthyroidism include nervousness, tremors, tachycardia, frequent stools, sweating, heat intolerance, weight loss, goiter, insomnia, palpitations and hypertension--the patient denies these issues today and has never really had these issues she said.     Inadequately treated maternal thyrotoxicosis is associated with a greater risk of severe preeclampsia and maternal heart failure than treated, controlled maternal thyrotoxicosis.   Overt treatment should be treated with a thioamide to minimize the risk of adverse outcomes. Either PTU or Methimazole(MMI) can be used to treat pregnant women with overt hyperthyroidism. MMI has been associated with a rare embryopathy characterized by esophageal or choanal atresia as well as aplasia acutis, a congenital skin defect, so this is usually reserved for after the first trimester in most instances--I recommended she transition to PTU at this time  and we will facilitate her transition to MMI around 13 weeks of pregnancy. Those exposed in the first trimester as compared to those in the second trimester although low risk there was still a 2-fold risk of major fetal malformations in those exposed to MMI compared to PTU. There is also a safety alert on PTU associated hepatotoxicity, so use beyond the first trimester is discouraged, but this is also a r/b discussion with the patient and can be individualized.  There is also the possibility of transient leukopenia in 10% of women who take thioamide drugs. Thus, if a fever or sore throat develops the patient should stop the medication immediately and report for a complete white count. Typically following her CBC and liver functions throughout the pregnancy are advisable.    ?  Typical dosing is PTU at 50-100mg BID and MMI 5-10mg daily. If the patient is tachycardic, Beta blockers may be given to ameliorate the symptoms of moderate to severe hyperthyroidism in pregnant women as this also blocks conversion from T3 to T4. She is currently on MMI but will transition to PTU.       Thyroid storm and thyrotoxic heart failure are acute and life-threatening conditions in pregnancy. Thyroid storm is rare, occurring in 1-2% in pregnancy patients with hyperthyroidism.     We discussed the associated outcomes in women with hyperthyroidism as follows:  -Effect of disease on pregnancy:  Associated pregnancy complications include increased risks of SAB, premature labor and delivery, fetal growth restriction or low birth weight, IUFD, preeclampsia, and heart failure. Close monitoring of fetal wellbeing is obviously recommended as outlined below. As for the maternal risks, low dose aspirin is recommended for attempted prevention of preeclampsia, and we should have a low threshold for obtaining a maternal ECHO if any concerns arise regarding her cardiac function.  -Medication safety:  Medical management of hyperthyroidism in pregnancy  usually involves the use of PTU in the first trimester (avoidance of methimazole during organogenesis due to the small risk of teratogenicity) with subsequent transition to methimazole in mid-pregnancy (avoidance of the adverse hepatic outcomes associated with PTU).  I will ultimately leave this to her endocrinologist.    -Fetal risks:  There is a 1-5% risk of fetal/ Grave’s disease in this setting due to the potential for transplacental passage of thyroid stimulating antibodies. Stigmata of fetal hyperthyroidism includes fetal tachycardia, fetal goiter, advanced bone age, fetal growth restriction, craniosynostosis, hydrops fetalis, and even IUFD. Close monitoring of fetal status with initiation of 1-2x ANFS at 32 weeks. Serial growth assessment is also recommended.  An increased risk of fetal loss,  delivery,  mortality, and large for gestational age infants has been reported in euthyroid women with high serum TPO antibody concentrations.       I reviewed the thyroid function tests drawn today.  They are moving in the right direction.  I do recommend TSI antibodies as this attests to the likelihood of  thyroid issues.  She asked about neurologic development with low TSH, and we discussed that this is more associated with high TSH in hypothyroidism, so I would recommend avoiding overtreatment of graves disease.      About 5% of babies of mom's with Grave's Disease (usually uncontrolled) get  graves which is associated with high fetal heart rate, goiter, advanced bone age, poor growth and in really severe cases things like heart failure.   TSH goal should be between 0.1-3.0 mU/L during pregnancy to reduce these risks.      Because a large proportion of thyroid disease in women is mediated by antibodies that cross the placenta, there is a concern about the risk of development of immune-mediated hypothyroidism and hyperthyroidism in the .     In some cases, maternal  TSH-binding inhibitory immunoglobulins may cause transient hypothyroidism in neonates of women with Graves disease.      Also, 1-5% of these neonates have hyperthyroidism or  Graves disease caused by the transplacental passage of maternal thyroid-stimulating immunoglobulin.  I would recommend checking TSI antibodies nonurgently with her next lab draw.        Advanced Maternal Age  [ X ] stable  [   ] improving [  ] worsening    The patient's age related risk for aneuploidy was reviewed. Noninvasive prenatal screening with cell-free fetal DNA (NIPT) results reviewed, which were normal.    Advanced maternal age has been associated with placental insufficiency with increased risk of fetal growth disorder and hypertensive disorders. Recommend fetal growth surveillance. Start  fetal surveillance with weekly BPP at 32-36 weeks.      Recommend baby ASA, which she is taking.        Recommendations:  -Detailed anatomy US at 18 weeks  -Serial growth assessment q4 weeks  - fetal surveillance at least weekly at 32 weeks  -Initiation of daily movement monitoring at 28 weeks  -close follow up with endocrinologist  -PTU in 1st trimester then transition back to methimizole  -Low dose aspirin recommended for attempted prevention of preeclampsia  -CBC and liver function tests recommended every 1-2 months during pregnancy  -TSI antibodies    Follow-up: No follow up with MFM scheduled, but I am happy to see for follow up at request of primary obstetrician    Thank you for the consult and opportunity to care for this patient.  Please feel free to reach out with any questions or concerns.      I spent 45 minutes caring for this patient on this date of service. This time includes time spent by me in the following activities: preparing for the visit, reviewing tests, obtaining and/or reviewing a separately obtained history, performing a medically appropriate examination and/or evaluation, counseling and educating  the patient/family/caregiver and independently interpreting results and communicating that information with the patient/family/caregiver with greater than 50% spent in counseling and coordination of care.     Martha Manuel MD [facility] [place]  Maternal Fetal Medicine-[facility]  Office: [phone]  [email or web address]          Martha Manuel MD  23 [phone]  Sign when Signing Visit  MATERNAL FETAL MEDICINE Consult Note    Dear Dr Stephanie Orta MD:    Thank you for your kind referral of Eileen Johnston.  As you know, she is a 47 y.o.   Here for preconception consult.      Her antepartum course is complicated by:  Graves Dz  AMA      HPI: Today, she denies headache, blurry vision, other issues.      Review of History:  Past Medical History:   Diagnosis Date   • Cervical dysplasia    • Graves disease     followed by Dr. Joy   • HPV (human papilloma virus) infection    • LGSIL on Pap smear of cervix    • Urinary tract infection     not for several years     Past Surgical History:   Procedure Laterality Date   • BREAST SURGERY Left     Titanium markers from bx   • CERVICAL BIOPSY  W/ LOOP ELECTRODE EXCISION     • COLPOSCOPY W/ BIOPSY / CURETTAGE     • MAMMO CORE NEEDLE BIOPSY UNILATERAL     • MOUTH SURGERY     • WISDOM TOOTH EXTRACTION         Social History     Socioeconomic History   • Marital status: Single   • Number of children: 0   • Years of education: post   Tobacco Use   • Smoking status: Never   • Smokeless tobacco: Never   • Tobacco comments:     No caffeine use    Vaping Use   • Vaping Use: Never used   Substance and Sexual Activity   • Alcohol use: Yes     Alcohol/week: 3.0 standard drinks     Types: 3 Glasses of wine per week     Comment: 3-4/week   • Drug use: No   • Sexual activity: Not Currently     Partners: Male     Birth control/protection: Condom     Family History   Problem Relation Age of Onset   • Arthritis Mother    • Alcohol abuse Father         alcohol free for over 10  years   • Diabetes Father         no longer after losing 100 lbs post bypass   • Hyperlipidemia Father         no longer after losing 100 lbs following bypass   • Hypertension Father         no longer after losing 100 lbs following bypass   • Heart disease Father         bypass   • Coronary artery disease Father         Bypass (5)   • Kidney cancer Maternal Grandmother    • Cancer Maternal Grandmother         kidney cancer; kidney removed   • Hyperlipidemia Maternal Grandmother    • Hypertension Maternal Grandmother    • Stroke Maternal Grandmother         cause of death at 92   • Thyroid disease Maternal Grandmother         hypothyroidism   • Cancer Maternal Grandfather         prostate cancer   • Prostate cancer Maternal Grandfather    • No Known Problems Paternal Grandmother    • Heart disease Paternal Grandfather         bypass   • Cancer Paternal Grandfather         skin cancer   • Diabetes Paternal Grandfather    • Coronary artery disease Paternal Grandfather         Bypass   • No Known Problems Maternal Aunt    • No Known Problems Paternal Aunt    • Miscarriages / Stillbirths Sister         2 miscarriages   • Thyroid disease Paternal Aunt         hypothyrodism   • BRCA 1/2 Neg Hx    • Breast cancer Neg Hx    • Colon cancer Neg Hx    • Endometrial cancer Neg Hx    • Ovarian cancer Neg Hx       No Known Allergies   Current Outpatient Medications on File Prior to Visit   Medication Sig Dispense Refill   • methIMAzole (TAPAZOLE) 5 MG tablet Take 2.5 mg by mouth Daily.       No current facility-administered medications on file prior to visit.        Past obstetric, gynecological, medical, surgical, family and social history reviewed.  Relevant lab work and imaging reviewed.    Review of systems  Constitutional:  denies fever, chills, malaise.   ENT/Mouth:  denies sore throat, tinnitis  Eyes: denies vision changes/pain  CV:  denies chest pain  Respiratory:  denies cough/SOB  GI:  denies N/V, diarrhea, abdominal  pain.    :   denies dysuria  Skin:  denies lesions or pruritis   Neuro:  denies weakness, focal neurologic symptoms    No vitals as this is a telehealth visit.      PHYSICAL EXAM   GENERAL: Not in acute distress, AAOx3, pleasant  No other physical exam as this was a telehealth visit.     ASSESSMENT/COUNSELIN y.o.   Here for preconception consult.          Diagnoses and all orders for this visit:    1. Pre-conception counseling (Primary)    2. Graves' disease    3. Advanced maternal age, primigravida, antepartum       Graves Disease:  Physiologic changes during pregnancy are considerable. Graves and hcg mediated hyperthyroidism are the most common causes of hyperthyroidism in pregnancy. Today I discussed the implications of hyperthyroidism in pregnancy. Hyperthyroidism is characterized by a decreased TSH level and an increased free FT4 level. The signs and symptoms of hyperthyroidism include nervousness, tremors, tachycardia, frequent stools, sweating, heat intolerance, weight loss, goiter, insomnia, palpitations and hypertension--the patient denies these issues today and has never really had these issues she said.     Inadequately treated maternal thyrotoxicosis is associated with a greater risk of severe preeclampsia and maternal heart failure than treated, controlled maternal thyrotoxicosis.   Overt treatment should be treated with a thioamide to minimize the risk of adverse outcomes. Either PTU or Methimazole(MMI) can be used to treat pregnant women with overt hyperthyroidism. MMI has been associated with a rare embryopathy characterized by esophageal or choanal atresia as well as aplasia acutis, a congenital skin defect, so this is usually reserved for after the first trimester in most instances--I recommended she transition to PTU at this time and we will facilitate her transition to MMI around 13 weeks of pregnancy. Those exposed in the first trimester as compared to those in the second trimester  although low risk there was still a 2-fold risk of major fetal malformations in those exposed to MMI compared to PTU. There is also a safety alert on PTU associated hepatotoxicity, so use beyond the first trimester is discouraged, but this is also a r/b discussion with the patient and can be individualized.  There is also the possibility of transient leukopenia in 10% of women who take thioamide drugs. Thus, if a fever or sore throat develops the patient should stop the medication immediately and report for a complete white count. Typically following her CBC and liver functions throughout the pregnancy are advisable.    ?  Typical dosing is PTU at 50-100mg BID and MMI 5-10mg daily. If the patient is tachycardic, Beta blockers may be given to ameliorate the symptoms of moderate to severe hyperthyroidism in pregnant women as this also blocks conversion from T3 to T4. She is over     Thyroid storm and thyrotoxic heart failure are acute and life-threatening conditions in pregnancy. Thyroid storm is rare, occurring in 1-2% in pregnancy patients with hyperthyroidism.     We discussed the associated outcomes in women with hyperthyroidism as follows:  -Effect of pregnancy on disease:  Grave’s disease often becomes less severe during pregnancy, particularly in the third trimester, due to a reduction in TSH-receptor antibody concentrations.  Close monitoring of TFTs is recommended with medication dosing adjustments as indicated.    -Effect of disease on pregnancy:  Associated pregnancy complications include increased risks of SAB, premature labor and delivery, fetal growth restriction or low birth weight, IUFD, preeclampsia, and heart failure. Close monitoring of fetal wellbeing is obviously recommended as outlined below. As for the maternal risks, low dose aspirin is recommended for attempted prevention of preeclampsia, and we should have a low threshold for obtaining a maternal ECHO if any concerns arise regarding her  cardiac function.  -Medication safety:  Medical management of hyperthyroidism in pregnancy usually involves the use of PTU in the first trimester (avoidance of methimazole during organogenesis due to the small risk of teratogenicity) with subsequent transition to methimazole in mid-pregnancy (avoidance of the adverse hepatic outcomes associated with PTU).   We discussed risks and benefits of initiating methimazole vs PTU today if her labs come back with an elevated T4.  She would like to do PTU today.  She says she was nauseated and did not like methimazole, which is why she came off of it.  PTU does have risks of hepatic toxicity, but can be continued even after the 1st trimester with a r/b discussion.  I would like her endocrinologist to weigh in on this, as well.      -Fetal risks:  There is a 1-5% risk of fetal/ Grave’s disease in this setting due to the potential for transplacental passage of thyroid stimulating antibodies. Stigmata of fetal hyperthyroidism includes fetal tachycardia, fetal goiter, advanced bone age, fetal growth restriction, craniosynostosis, hydrops fetalis, and even IUFD. Close monitoring of fetal status with initiation of twice weekly ANFS at 32 weeks. Serial growth assessment is also recommended      I reviewed the thyroid function tests drawn today.  Her TSH is <0.005 microIU/mL and Free T4 was 4.75ng/dL a couple weeks ago.  Total T4 was 20.20 mg/dL.   These results are consistent with uncontrolled Graves disease.  I called her and initiated her on  mg BID.  She will need CMP/CBC at her next OB visit.   I do recommend that her thyroid function tests be evaluated every 4 weeks with a TSH and Free T4, or sooner if she becomes symptomatic. In addition throughout the pregnancy, I recommend a comprehensive anatomy ultrasound at 18-20 weeks and serial ultrasounds every four weeks starting at 24 weeks gestation to ensure appropriate fetal growth and surveil for fetal goiter given  the possibility of Grave's antibodies.      Recommendations:  -Detailed anatomy US at 18 weeks  -Serial growth assessment q4 weeks  - fetal surveillance at least weekly at 32 weeks  -Initiation of daily movement monitoring at 28 weeks  -Initiation of twice weekly ANFS at 32 weeks, with weekly ultrasounds for BPPs to also include evaluation of the fetal neck for evidence of a goiter  -Pt to reach out to endocrinologist for them to follow--this is of utmost importance and that was expressed  -Continue PTU for now with recommendations to transition to methimazole in the near future  -Low dose aspirin recommended for attempted prevention of preeclampsia  -CBC and liver function tests recommended at next OB visit--need to be ordered      On methimazole    Advanced Maternal Age  [ X ] stable  [   ] improving [  ] worsening    The patient's age related risk for aneuploidy was reviewed. Noninvasive prenatal screening with cell-free fetal DNA (NIPT) results reviewed, which were normal.    Advanced maternal age has been associated with placental insufficiency with increased risk of fetal growth disorder and hypertensive disorders. Recommend fetal growth surveillance. Start  fetal surveillance with weekly BPP at 32-36 weeks.      Recommend baby ASA, which she is taking.          Pregnancy outcomes -- An increased risk of fetal loss,  delivery,  mortality, and large for gestational age infants has been reported in euthyroid women with high serum TPO antibody concentrations [32-35]. In meta-analyses of case-control and cohort studies, the presence of thyroid autoantibodies in euthyroid women was associated with an increased risk of spontaneous miscarriage that is two to three times higher than in women without antibodies [36,37]. In addition, the risk of  birth was approximately doubled [13,37,38].    Hi Ms. Johnston,    It was wonderful to talk to you on Friday!  I wish you the best of luck  with your fertility goals and hope I see you in a few months with a new pregnancy.      I wanted to follow up on your question about TSH relationship with mental delay.  As I thought, the studies link hypothyroidism to developmental delay, but they did use TSH as a measure and it correlated with developmental delay when it was too high. In one study, they noted IQ scores of children of mothers with high TSH (mean was 13 mU/L during 2nd trimester) were slightly lower.      Hypothyroidism can be associated with delays.  So, overtreatment of hyperthyroidism.      About 5% of babies of mom's with Grave's Disease (usually uncontrolled) get  graves which is associated with high fetal heart rate, goiter, advanced bone age, poor growth and in really severe cases things like heart failure.       It'll be important to try and get your TSH between 0.1-4.0 mU/L during pregnancy to reduce these risks.      I hope this helps--best of luck and please let me know if you have other questions or if I can be of help during this process!    Dr. Manuel        Summary of Plan  -Serial growth ultrasounds every 4 - 6 weeks (by primary OB)   -Starting at 28 - 32 weeks: Weekly fetal  surveillance until delivery   -Starting at 28 weeks: Fetal movement instructions given continue daily until delivery; instructed to report to labor and delivery if cannot achieve more than 10 kicks in one hour or if she perceives a decrease in fetal movement    Follow-up: No follow up with MFM scheduled, but I am happy to see for follow up at request of primary obstetrician    Thank you for the consult and opportunity to care for this patient.  Please feel free to reach out with any questions or concerns.      I spent *** minutes caring for this patient on this date of service. This time includes time spent by me in the following activities: preparing for the visit, reviewing tests, obtaining and/or reviewing a separately obtained history,  performing a medically appropriate examination and/or evaluation, counseling and educating the patient/family/caregiver and independently interpreting results and communicating that information with the patient/family/caregiver with greater than 50% spent in counseling and coordination of care.     Martha Manuel MD FACOG  Maternal Fetal Medicine-[facility]  Office: [phone]  [email or web address]

## 2023-01-06 NOTE — PROGRESS NOTES
MATERNAL FETAL MEDICINE Consult Note    Dear Dr Stephanie Orta MD:    Thank you for your kind referral of Eileen Johnston.  As you know, she is a 47 y.o.   Here for preconception consult.      Her antepartum course is complicated by:  Graves Dz  AMA      HPI: Today, she denies headache, blurry vision, other issues.      Review of History:  Past Medical History:   Diagnosis Date   • Cervical dysplasia    • Graves disease     followed by Dr. Joy   • HPV (human papilloma virus) infection    • LGSIL on Pap smear of cervix    • Urinary tract infection     not for several years     Past Surgical History:   Procedure Laterality Date   • BREAST SURGERY Left     Titanium markers from bx   • CERVICAL BIOPSY  W/ LOOP ELECTRODE EXCISION     • COLPOSCOPY W/ BIOPSY / CURETTAGE     • MAMMO CORE NEEDLE BIOPSY UNILATERAL     • MOUTH SURGERY     • WISDOM TOOTH EXTRACTION         Social History     Socioeconomic History   • Marital status: Single   • Number of children: 0   • Years of education: post   Tobacco Use   • Smoking status: Never   • Smokeless tobacco: Never   • Tobacco comments:     No caffeine use    Vaping Use   • Vaping Use: Never used   Substance and Sexual Activity   • Alcohol use: Yes     Alcohol/week: 3.0 standard drinks     Types: 3 Glasses of wine per week     Comment: 3-4/week   • Drug use: No   • Sexual activity: Not Currently     Partners: Male     Birth control/protection: Condom     Family History   Problem Relation Age of Onset   • Arthritis Mother    • Alcohol abuse Father         alcohol free for over 10 years   • Diabetes Father         no longer after losing 100 lbs post bypass   • Hyperlipidemia Father         no longer after losing 100 lbs following bypass   • Hypertension Father         no longer after losing 100 lbs following bypass   • Heart disease Father         bypass   • Coronary artery disease Father         Bypass (5)   • Kidney cancer Maternal Grandmother    • Cancer Maternal  Grandmother         kidney cancer; kidney removed   • Hyperlipidemia Maternal Grandmother    • Hypertension Maternal Grandmother    • Stroke Maternal Grandmother         cause of death at 92   • Thyroid disease Maternal Grandmother         hypothyroidism   • Cancer Maternal Grandfather         prostate cancer   • Prostate cancer Maternal Grandfather    • No Known Problems Paternal Grandmother    • Heart disease Paternal Grandfather         bypass   • Cancer Paternal Grandfather         skin cancer   • Diabetes Paternal Grandfather    • Coronary artery disease Paternal Grandfather         Bypass   • No Known Problems Maternal Aunt    • No Known Problems Paternal Aunt    • Miscarriages / Stillbirths Sister         2 miscarriages   • Thyroid disease Paternal Aunt         hypothyrodism   • BRCA 1/2 Neg Hx    • Breast cancer Neg Hx    • Colon cancer Neg Hx    • Endometrial cancer Neg Hx    • Ovarian cancer Neg Hx       No Known Allergies   Current Outpatient Medications on File Prior to Visit   Medication Sig Dispense Refill   • methIMAzole (TAPAZOLE) 5 MG tablet Take 2.5 mg by mouth Daily.       No current facility-administered medications on file prior to visit.        Past obstetric, gynecological, medical, surgical, family and social history reviewed.  Relevant lab work and imaging reviewed.    Review of systems  Constitutional:  denies fever, chills, malaise.   ENT/Mouth:  denies sore throat, tinnitis  Eyes: denies vision changes/pain  CV:  denies chest pain  Respiratory:  denies cough/SOB  GI:  denies N/V, diarrhea, abdominal pain.    :   denies dysuria  Skin:  denies lesions or pruritis   Neuro:  denies weakness, focal neurologic symptoms    No vitals as this is a telehealth visit.      PHYSICAL EXAM   GENERAL: Not in acute distress, AAOx3, pleasant  No other physical exam as this was a telehealth visit.     ASSESSMENT/COUNSEL[age] y.o.   Here for preconception consult.          Diagnoses and all orders  for this visit:    1. Pre-conception counseling (Primary)    2. Graves' disease    3. Advanced maternal age, primigravida, antepartum       Graves Disease:  Physiologic changes during pregnancy are considerable. Graves and hcg mediated hyperthyroidism are the most common causes of hyperthyroidism in pregnancy. Today I discussed the implications of hyperthyroidism in pregnancy. Hyperthyroidism is characterized by a decreased TSH level and an increased free FT4 level. The signs and symptoms of hyperthyroidism include nervousness, tremors, tachycardia, frequent stools, sweating, heat intolerance, weight loss, goiter, insomnia, palpitations and hypertension--the patient denies these issues today and has never really had these issues she said.     Inadequately treated maternal thyrotoxicosis is associated with a greater risk of severe preeclampsia and maternal heart failure than treated, controlled maternal thyrotoxicosis.   Overt treatment should be treated with a thioamide to minimize the risk of adverse outcomes. Either PTU or Methimazole(MMI) can be used to treat pregnant women with overt hyperthyroidism. MMI has been associated with a rare embryopathy characterized by esophageal or choanal atresia as well as aplasia acutis, a congenital skin defect, so this is usually reserved for after the first trimester in most instances--I recommended she transition to PTU at this time and we will facilitate her transition to MMI around 13 weeks of pregnancy. Those exposed in the first trimester as compared to those in the second trimester although low risk there was still a 2-fold risk of major fetal malformations in those exposed to MMI compared to PTU. There is also a safety alert on PTU associated hepatotoxicity, so use beyond the first trimester is discouraged, but this is also a r/b discussion with the patient and can be individualized.  There is also the possibility of transient leukopenia in 10% of women who take  thioamide drugs. Thus, if a fever or sore throat develops the patient should stop the medication immediately and report for a complete white count. Typically following her CBC and liver functions throughout the pregnancy are advisable.    ?  Typical dosing is PTU at 50-100mg BID and MMI 5-10mg daily. If the patient is tachycardic, Beta blockers may be given to ameliorate the symptoms of moderate to severe hyperthyroidism in pregnant women as this also blocks conversion from T3 to T4. She is currently on MMI but will transition to PTU.       Thyroid storm and thyrotoxic heart failure are acute and life-threatening conditions in pregnancy. Thyroid storm is rare, occurring in 1-2% in pregnancy patients with hyperthyroidism.     We discussed the associated outcomes in women with hyperthyroidism as follows:  -Effect of disease on pregnancy:  Associated pregnancy complications include increased risks of SAB, premature labor and delivery, fetal growth restriction or low birth weight, IUFD, preeclampsia, and heart failure. Close monitoring of fetal wellbeing is obviously recommended as outlined below. As for the maternal risks, low dose aspirin is recommended for attempted prevention of preeclampsia, and we should have a low threshold for obtaining a maternal ECHO if any concerns arise regarding her cardiac function.  -Medication safety:  Medical management of hyperthyroidism in pregnancy usually involves the use of PTU in the first trimester (avoidance of methimazole during organogenesis due to the small risk of teratogenicity) with subsequent transition to methimazole in mid-pregnancy (avoidance of the adverse hepatic outcomes associated with PTU).  I will ultimately leave this to her endocrinologist.    -Fetal risks:  There is a 1-5% risk of fetal/ Grave’s disease in this setting due to the potential for transplacental passage of thyroid stimulating antibodies. Stigmata of fetal hyperthyroidism includes fetal  tachycardia, fetal goiter, advanced bone age, fetal growth restriction, craniosynostosis, hydrops fetalis, and even IUFD. Close monitoring of fetal status with initiation of 1-2x ANFS at 32 weeks. Serial growth assessment is also recommended.  An increased risk of fetal loss,  delivery,  mortality, and large for gestational age infants has been reported in euthyroid women with high serum TPO antibody concentrations.       I reviewed the thyroid function tests drawn today.  They are moving in the right direction.  I do recommend TSI antibodies as this attests to the likelihood of  thyroid issues.  She asked about neurologic development with low TSH, and we discussed that this is more associated with high TSH in hypothyroidism, so I would recommend avoiding overtreatment of graves disease.      About 5% of babies of mom's with Grave's Disease (usually uncontrolled) get  graves which is associated with high fetal heart rate, goiter, advanced bone age, poor growth and in really severe cases things like heart failure.   TSH goal should be between 0.1-3.0 mU/L during pregnancy to reduce these risks.      Because a large proportion of thyroid disease in women is mediated by antibodies that cross the placenta, there is a concern about the risk of development of immune-mediated hypothyroidism and hyperthyroidism in the .     In some cases, maternal TSH-binding inhibitory immunoglobulins may cause transient hypothyroidism in neonates of women with Graves disease.      Also, 1-5% of these neonates have hyperthyroidism or  Graves disease caused by the transplacental passage of maternal thyroid-stimulating immunoglobulin.  I would recommend checking TSI antibodies nonurgently with her next lab draw.        Advanced Maternal Age  [ X ] stable  [   ] improving [  ] worsening    The patient's age related risk for aneuploidy was reviewed. Noninvasive prenatal screening with cell-free  fetal DNA (NIPT) results reviewed, which were normal.    Advanced maternal age has been associated with placental insufficiency with increased risk of fetal growth disorder and hypertensive disorders. Recommend fetal growth surveillance. Start  fetal surveillance with weekly BPP at 32-36 weeks.      Recommend baby ASA, which she is taking.        Recommendations:  -Detailed anatomy US at 18 weeks  -Serial growth assessment q4 weeks  - fetal surveillance at least weekly at 32 weeks  -Initiation of daily movement monitoring at 28 weeks  -close follow up with endocrinologist  -PTU in 1st trimester then transition back to methimizole  -Low dose aspirin recommended for attempted prevention of preeclampsia  -CBC and liver function tests recommended every 1-2 months during pregnancy  -TSI antibodies    Follow-up: No follow up with MFM scheduled, but I am happy to see for follow up at request of primary obstetrician    Thank you for the consult and opportunity to care for this patient.  Please feel free to reach out with any questions or concerns.      I spent 45 minutes caring for this patient on this date of service. This time includes time spent by me in the following activities: preparing for the visit, reviewing tests, obtaining and/or reviewing a separately obtained history, performing a medically appropriate examination and/or evaluation, counseling and educating the patient/family/caregiver and independently interpreting results and communicating that information with the patient/family/caregiver with greater than 50% spent in counseling and coordination of care.     Martha Manuel MD FACOG  Maternal Fetal Medicine-[facility]  Office: [phone]  [email or web address]

## 2023-01-06 NOTE — LETTER
2023     Stephanie Orta MD  [address] [facility] [address]    Patient: Eileen Johnston   YOB: 1975   Date of Visit: 2023       Dear Dr. You MD:    Thank you for referring Eileen Johnston to me for evaluation. Below are the relevant portions of my assessment and plan of care.    If you have questions, please do not hesitate to call me. I look forward to following Eileen along with you.         Sincerely,        Martha Manuel MD        CC: No Recipients  Martha Manuel MD  23 [phone]  Signed  MATERNAL FETAL MEDICINE Consult Note    Dear Dr Stephanie Orta MD:    Thank you for your kind referral of Eileen Johnston.  As you know, she is a 47 y.o.   Here for preconception consult.      Her antepartum course is complicated by:  Graves Dz  AMA      HPI: Today, she denies headache, blurry vision, other issues.      Review of History:  Past Medical History:   Diagnosis Date   • Cervical dysplasia    • Graves disease     followed by Dr. Joy   • HPV (human papilloma virus) infection    • LGSIL on Pap smear of cervix    • Urinary tract infection     not for several years     Past Surgical History:   Procedure Laterality Date   • BREAST SURGERY Left     Titanium markers from bx   • CERVICAL BIOPSY  W/ LOOP ELECTRODE EXCISION     • COLPOSCOPY W/ BIOPSY / CURETTAGE     • MAMMO CORE NEEDLE BIOPSY UNILATERAL     • MOUTH SURGERY     • WISDOM TOOTH EXTRACTION         Social History     Socioeconomic History   • Marital status: Single   • Number of children: 0   • Years of education: post   Tobacco Use   • Smoking status: Never   • Smokeless tobacco: Never   • Tobacco comments:     No caffeine use    Vaping Use   • Vaping Use: Never used   Substance and Sexual Activity   • Alcohol use: Yes     Alcohol/week: 3.0 standard drinks     Types: 3 Glasses of wine per week     Comment: 3-4/week   • Drug use: No   • Sexual activity: Not Currently     Partners: Male     Birth  control/protection: Condom     Family History   Problem Relation Age of Onset   • Arthritis Mother    • Alcohol abuse Father         alcohol free for over 10 years   • Diabetes Father         no longer after losing 100 lbs post bypass   • Hyperlipidemia Father         no longer after losing 100 lbs following bypass   • Hypertension Father         no longer after losing 100 lbs following bypass   • Heart disease Father         bypass   • Coronary artery disease Father         Bypass (5)   • Kidney cancer Maternal Grandmother    • Cancer Maternal Grandmother         kidney cancer; kidney removed   • Hyperlipidemia Maternal Grandmother    • Hypertension Maternal Grandmother    • Stroke Maternal Grandmother         cause of death at 92   • Thyroid disease Maternal Grandmother         hypothyroidism   • Cancer Maternal Grandfather         prostate cancer   • Prostate cancer Maternal Grandfather    • No Known Problems Paternal Grandmother    • Heart disease Paternal Grandfather         bypass   • Cancer Paternal Grandfather         skin cancer   • Diabetes Paternal Grandfather    • Coronary artery disease Paternal Grandfather         Bypass   • No Known Problems Maternal Aunt    • No Known Problems Paternal Aunt    • Miscarriages / Stillbirths Sister         2 miscarriages   • Thyroid disease Paternal Aunt         hypothyrodism   • BRCA 1/2 Neg Hx    • Breast cancer Neg Hx    • Colon cancer Neg Hx    • Endometrial cancer Neg Hx    • Ovarian cancer Neg Hx       No Known Allergies   Current Outpatient Medications on File Prior to Visit   Medication Sig Dispense Refill   • methIMAzole (TAPAZOLE) 5 MG tablet Take 2.5 mg by mouth Daily.       No current facility-administered medications on file prior to visit.        Past obstetric, gynecological, medical, surgical, family and social history reviewed.  Relevant lab work and imaging reviewed.    Review of systems  Constitutional:  denies fever, chills, malaise.   ENT/Mouth:   denies sore throat, tinnitis  Eyes: denies vision changes/pain  CV:  denies chest pain  Respiratory:  denies cough/SOB  GI:  denies N/V, diarrhea, abdominal pain.    :   denies dysuria  Skin:  denies lesions or pruritis   Neuro:  denies weakness, focal neurologic symptoms    No vitals as this is a telehealth visit.      PHYSICAL EXAM   GENERAL: Not in acute distress, AAOx3, pleasant  No other physical exam as this was a telehealth visit.     ASSESSMENT/COUNSELIN y.o.   Here for preconception consult.          Diagnoses and all orders for this visit:    1. Pre-conception counseling (Primary)    2. Graves' disease    3. Advanced maternal age, primigravida, antepartum       Graves Disease:  Physiologic changes during pregnancy are considerable. Graves and hcg mediated hyperthyroidism are the most common causes of hyperthyroidism in pregnancy. Today I discussed the implications of hyperthyroidism in pregnancy. Hyperthyroidism is characterized by a decreased TSH level and an increased free FT4 level. The signs and symptoms of hyperthyroidism include nervousness, tremors, tachycardia, frequent stools, sweating, heat intolerance, weight loss, goiter, insomnia, palpitations and hypertension--the patient denies these issues today and has never really had these issues she said.     Inadequately treated maternal thyrotoxicosis is associated with a greater risk of severe preeclampsia and maternal heart failure than treated, controlled maternal thyrotoxicosis.   Overt treatment should be treated with a thioamide to minimize the risk of adverse outcomes. Either PTU or Methimazole(MMI) can be used to treat pregnant women with overt hyperthyroidism. MMI has been associated with a rare embryopathy characterized by esophageal or choanal atresia as well as aplasia acutis, a congenital skin defect, so this is usually reserved for after the first trimester in most instances--I recommended she transition to PTU at this time  and we will facilitate her transition to MMI around 13 weeks of pregnancy. Those exposed in the first trimester as compared to those in the second trimester although low risk there was still a 2-fold risk of major fetal malformations in those exposed to MMI compared to PTU. There is also a safety alert on PTU associated hepatotoxicity, so use beyond the first trimester is discouraged, but this is also a r/b discussion with the patient and can be individualized.  There is also the possibility of transient leukopenia in 10% of women who take thioamide drugs. Thus, if a fever or sore throat develops the patient should stop the medication immediately and report for a complete white count. Typically following her CBC and liver functions throughout the pregnancy are advisable.    ?  Typical dosing is PTU at 50-100mg BID and MMI 5-10mg daily. If the patient is tachycardic, Beta blockers may be given to ameliorate the symptoms of moderate to severe hyperthyroidism in pregnant women as this also blocks conversion from T3 to T4. She is currently on MMI but will transition to PTU.       Thyroid storm and thyrotoxic heart failure are acute and life-threatening conditions in pregnancy. Thyroid storm is rare, occurring in 1-2% in pregnancy patients with hyperthyroidism.     We discussed the associated outcomes in women with hyperthyroidism as follows:  -Effect of disease on pregnancy:  Associated pregnancy complications include increased risks of SAB, premature labor and delivery, fetal growth restriction or low birth weight, IUFD, preeclampsia, and heart failure. Close monitoring of fetal wellbeing is obviously recommended as outlined below. As for the maternal risks, low dose aspirin is recommended for attempted prevention of preeclampsia, and we should have a low threshold for obtaining a maternal ECHO if any concerns arise regarding her cardiac function.  -Medication safety:  Medical management of hyperthyroidism in pregnancy  usually involves the use of PTU in the first trimester (avoidance of methimazole during organogenesis due to the small risk of teratogenicity) with subsequent transition to methimazole in mid-pregnancy (avoidance of the adverse hepatic outcomes associated with PTU).  I will ultimately leave this to her endocrinologist.    -Fetal risks:  There is a 1-5% risk of fetal/ Grave’s disease in this setting due to the potential for transplacental passage of thyroid stimulating antibodies. Stigmata of fetal hyperthyroidism includes fetal tachycardia, fetal goiter, advanced bone age, fetal growth restriction, craniosynostosis, hydrops fetalis, and even IUFD. Close monitoring of fetal status with initiation of 1-2x ANFS at 32 weeks. Serial growth assessment is also recommended.  An increased risk of fetal loss,  delivery,  mortality, and large for gestational age infants has been reported in euthyroid women with high serum TPO antibody concentrations.       I reviewed the thyroid function tests drawn today.  They are moving in the right direction.  I do recommend TSI antibodies as this attests to the likelihood of  thyroid issues.  She asked about neurologic development with low TSH, and we discussed that this is more associated with high TSH in hypothyroidism, so I would recommend avoiding overtreatment of graves disease.      About 5% of babies of mom's with Grave's Disease (usually uncontrolled) get  graves which is associated with high fetal heart rate, goiter, advanced bone age, poor growth and in really severe cases things like heart failure.   TSH goal should be between 0.1-3.0 mU/L during pregnancy to reduce these risks.      Because a large proportion of thyroid disease in women is mediated by antibodies that cross the placenta, there is a concern about the risk of development of immune-mediated hypothyroidism and hyperthyroidism in the .     In some cases, maternal  TSH-binding inhibitory immunoglobulins may cause transient hypothyroidism in neonates of women with Graves disease.      Also, 1-5% of these neonates have hyperthyroidism or  Graves disease caused by the transplacental passage of maternal thyroid-stimulating immunoglobulin.  I would recommend checking TSI antibodies nonurgently with her next lab draw.        Advanced Maternal Age  [ X ] stable  [   ] improving [  ] worsening    The patient's age related risk for aneuploidy was reviewed. Noninvasive prenatal screening with cell-free fetal DNA (NIPT) results reviewed, which were normal.    Advanced maternal age has been associated with placental insufficiency with increased risk of fetal growth disorder and hypertensive disorders. Recommend fetal growth surveillance. Start  fetal surveillance with weekly BPP at 32-36 weeks.      Recommend baby ASA, which she is taking.        Recommendations:  -Detailed anatomy US at 18 weeks  -Serial growth assessment q4 weeks  - fetal surveillance at least weekly at 32 weeks  -Initiation of daily movement monitoring at 28 weeks  -close follow up with endocrinologist  -PTU in 1st trimester then transition back to methimizole  -Low dose aspirin recommended for attempted prevention of preeclampsia  -CBC and liver function tests recommended every 1-2 months during pregnancy  -TSI antibodies    Follow-up: No follow up with MFM scheduled, but I am happy to see for follow up at request of primary obstetrician    Thank you for the consult and opportunity to care for this patient.  Please feel free to reach out with any questions or concerns.      I spent 45 minutes caring for this patient on this date of service. This time includes time spent by me in the following activities: preparing for the visit, reviewing tests, obtaining and/or reviewing a separately obtained history, performing a medically appropriate examination and/or evaluation, counseling and educating  the patient/family/caregiver and independently interpreting results and communicating that information with the patient/family/caregiver with greater than 50% spent in counseling and coordination of care.     Martha Manuel MD [facility]  Maternal Fetal Medicine-[facility]  Office: [phone]  [email or web address]

## 2023-03-19 ENCOUNTER — PATIENT MESSAGE (OUTPATIENT)
Dept: OBSTETRICS AND GYNECOLOGY | Age: 48
End: 2023-03-19
Payer: COMMERCIAL

## 2023-03-20 ENCOUNTER — OFFICE VISIT (OUTPATIENT)
Dept: OBSTETRICS AND GYNECOLOGY | Age: 48
End: 2023-03-20
Payer: COMMERCIAL

## 2023-03-20 DIAGNOSIS — N92.6 IRREGULAR MENSES: ICD-10-CM

## 2023-03-20 DIAGNOSIS — Z31.69 PRE-CONCEPTION COUNSELING: Primary | ICD-10-CM

## 2023-03-20 PROCEDURE — 99442 PR PHYS/QHP TELEPHONE EVALUATION 11-20 MIN: CPT | Performed by: OBSTETRICS & GYNECOLOGY

## 2023-03-20 NOTE — TELEPHONE ENCOUNTER
From: Eileen Johnston  To: Stephanie Orta  Sent: 3/19/2023 11:12 PM EDT  Subject: Estrogen level    Dr. Orta - I have been seeing Dr. Napoles for IVF. In January without medication, he determined I had two mature follicles and I had extraction but the insemination did not work. My estrogen level was 247.8 pg/ML. I tried again at end of Feb/beginning of March. My estrogen level after my period and 2.5 days of taking estrogen was 68.4. But, 10 days later it was < 5 and stayed that way for the next 2 labs. I took 10 days of injections and had follicles but not growing as rapidly as he wanted and my estrogen level remained low. Could we have a telehealth to discuss please? I'm worried why my estrogen level dropped so dramatically after taking injections. Thanks.

## 2023-03-20 NOTE — PROGRESS NOTES
GYN Visit    3/20/2023    Patient: Eileen Johnston          MR#:[id number]      Chief Complaint   Patient presents with   • hormone issue     Hormone issue       History of Present Illness    47 y.o. female  who presents for  Telephone appt  Asked for telehealth appt- permission to proceed  Consent obtained    Doing IVF with Dr Napoles,   Had a baseline scan and had nice follicles so harvested, hormones good, one egg eventually did not become viable  On second cycle and estrogen level is low and Dr. Napoles says to not proceed with trigger injection    Pt worried about menopause and why her estrogen is low this cycle  My thought is variation in health of follicles each cycle  Encouraged to try another cycle but also consider donor egg    Long discussion about menopause and common presentations    Supportive conversation.     20 minutes on phone.         No LMP recorded.    ________________________________________  Patient Active Problem List   Diagnosis   • Graves disease   • Crushing injury of finger of right hand       Past Medical History:   Diagnosis Date   • Cervical dysplasia    • Graves disease     followed by Dr. Joy   • HPV (human papilloma virus) infection    • LGSIL on Pap smear of cervix    • Urinary tract infection     not for several years       Past Surgical History:   Procedure Laterality Date   • BREAST SURGERY Left     Titanium markers from bx   • CERVICAL BIOPSY  W/ LOOP ELECTRODE EXCISION     • COLPOSCOPY W/ BIOPSY / CURETTAGE     • MAMMO CORE NEEDLE BIOPSY UNILATERAL     • MOUTH SURGERY     • WISDOM TOOTH EXTRACTION         Social History     Tobacco Use   Smoking Status Never   Smokeless Tobacco Never   Tobacco Comments    No caffeine use        has a current medication list which includes the following prescription(s): methimazole.  ________________________________________    Current contraception: none      The following portions of the patient's history were reviewed and updated as  "appropriate: allergies, current medications, past family history, past medical history, past social history, past surgical history and problem list.    Review of Systems    Pertinent items are noted in HPI.     Objective   Physical Exam    There were no vitals taken for this visit.   BP Readings from Last 3 Encounters:   01/06/23 112/72   12/30/22 98/60   11/28/22 120/68      Wt Readings from Last 3 Encounters:   01/06/23 66.2 kg (145 lb 15.1 oz)   12/30/22 66.2 kg (146 lb)   11/28/22 66.6 kg (146 lb 12.8 oz)      BMI: Estimated body mass index is 24.32 kg/m² as calculated from the following:    Height as of 1/6/23: 165 cm (64.96\").    Weight as of 1/6/23: 66.2 kg (145 lb 15.1 oz).    Lungs: non labored breathing, no wheezing or tachpnea, no difficulty during phone conversation    General:   alert and cooperative                                 Assessment:      Diagnoses and all orders for this visit:    1. Pre-conception counseling (Primary)    2. Irregular menses      Discussed AMA, perimenopause, advice on particular management deferred to   Dr Napoles.\  Taking PNV        "

## 2023-04-14 ENCOUNTER — LAB (OUTPATIENT)
Dept: FAMILY MEDICINE CLINIC | Facility: CLINIC | Age: 48
End: 2023-04-14
Payer: COMMERCIAL

## 2023-04-14 DIAGNOSIS — Z11.59 NEED FOR HEPATITIS C SCREENING TEST: ICD-10-CM

## 2023-04-14 DIAGNOSIS — Z00.00 ROUTINE GENERAL MEDICAL EXAMINATION AT A HEALTH CARE FACILITY: Primary | ICD-10-CM

## 2023-04-14 DIAGNOSIS — Z13.220 SCREENING FOR LIPOID DISORDERS: ICD-10-CM

## 2023-04-14 DIAGNOSIS — E05.00 GRAVES DISEASE: ICD-10-CM

## 2023-04-15 LAB
ALBUMIN SERPL-MCNC: 4.6 G/DL (ref 3.5–5.2)
ALBUMIN/GLOB SERPL: 1.7 G/DL
ALP SERPL-CCNC: 69 U/L (ref 39–117)
ALT SERPL-CCNC: 19 U/L (ref 1–33)
AST SERPL-CCNC: 20 U/L (ref 1–32)
BILIRUB SERPL-MCNC: 0.8 MG/DL (ref 0–1.2)
BUN SERPL-MCNC: 14 MG/DL (ref 6–20)
BUN/CREAT SERPL: 15.6 (ref 7–25)
CALCIUM SERPL-MCNC: 9.5 MG/DL (ref 8.6–10.5)
CHLORIDE SERPL-SCNC: 103 MMOL/L (ref 98–107)
CHOLEST SERPL-MCNC: 215 MG/DL (ref 0–200)
CO2 SERPL-SCNC: 27.8 MMOL/L (ref 22–29)
CREAT SERPL-MCNC: 0.9 MG/DL (ref 0.57–1)
EGFRCR SERPLBLD CKD-EPI 2021: 78.5 ML/MIN/1.73
ERYTHROCYTE [DISTWIDTH] IN BLOOD BY AUTOMATED COUNT: 12.9 % (ref 12.3–15.4)
FT4I SERPL CALC-MCNC: 1.6 (ref 1.2–4.9)
GLOBULIN SER CALC-MCNC: 2.7 GM/DL
GLUCOSE SERPL-MCNC: 96 MG/DL (ref 65–99)
HCT VFR BLD AUTO: 40.5 % (ref 34–46.6)
HCV IGG SERPL QL IA: NON REACTIVE
HDLC SERPL-MCNC: 88 MG/DL (ref 40–60)
HGB BLD-MCNC: 13.4 G/DL (ref 12–15.9)
LDLC SERPL CALC-MCNC: 117 MG/DL (ref 0–100)
LDLC/HDLC SERPL: 1.31 {RATIO}
MCH RBC QN AUTO: 29.7 PG (ref 26.6–33)
MCHC RBC AUTO-ENTMCNC: 33.1 G/DL (ref 31.5–35.7)
MCV RBC AUTO: 89.8 FL (ref 79–97)
PLATELET # BLD AUTO: 247 10*3/MM3 (ref 140–450)
POTASSIUM SERPL-SCNC: 4.3 MMOL/L (ref 3.5–5.2)
PROT SERPL-MCNC: 7.3 G/DL (ref 6–8.5)
RBC # BLD AUTO: 4.51 10*6/MM3 (ref 3.77–5.28)
SODIUM SERPL-SCNC: 141 MMOL/L (ref 136–145)
T3RU NFR SERPL: 27 % (ref 24–39)
T4 SERPL-MCNC: 6 UG/DL (ref 4.5–12)
TRIGL SERPL-MCNC: 58 MG/DL (ref 0–150)
TSH SERPL DL<=0.005 MIU/L-ACNC: 2.88 UIU/ML (ref 0.45–4.5)
VLDLC SERPL CALC-MCNC: 10 MG/DL (ref 5–40)
WBC # BLD AUTO: 4.63 10*3/MM3 (ref 3.4–10.8)

## 2023-04-17 LAB
THYROGLOB AB SERPL-ACNC: 2.1 IU/ML (ref 0–0.9)
THYROPEROXIDASE AB SERPL-ACNC: 43 IU/ML (ref 0–34)

## 2023-04-18 ENCOUNTER — OFFICE VISIT (OUTPATIENT)
Dept: FAMILY MEDICINE CLINIC | Facility: CLINIC | Age: 48
End: 2023-04-18
Payer: COMMERCIAL

## 2023-04-18 VITALS
HEIGHT: 65 IN | HEART RATE: 71 BPM | OXYGEN SATURATION: 99 % | RESPIRATION RATE: 16 BRPM | BODY MASS INDEX: 25.66 KG/M2 | DIASTOLIC BLOOD PRESSURE: 64 MMHG | SYSTOLIC BLOOD PRESSURE: 116 MMHG | WEIGHT: 154 LBS

## 2023-04-18 DIAGNOSIS — Z00.00 ROUTINE GENERAL MEDICAL EXAMINATION AT A HEALTH CARE FACILITY: Primary | ICD-10-CM

## 2023-04-18 PROCEDURE — 99396 PREV VISIT EST AGE 40-64: CPT | Performed by: FAMILY MEDICINE

## 2023-04-18 RX ORDER — UREA 10 %
800 LOTION (ML) TOPICAL DAILY
COMMUNITY

## 2023-04-18 RX ORDER — FAMOTIDINE 20 MG
TABLET ORAL
COMMUNITY

## 2023-04-18 NOTE — PROGRESS NOTES
"Preventive Exam    History of Present Illness: Eileen is here for check up and review of routine health maintenance. She states she is doing well and has no concerns.  She has a diagnosis of Graves' disease and is followed by endocrinology.  There have been a few small changes in her labs and she will follow-up with her endocrinologist soon.  She is not on any medication.  She is taking prenatal vitamins and is working with fertility specialists in hopes of getting pregnant.  She is taking appropriate vitamin supplement.    Pap Smear:UTD  Mammogram:UTD  Colon cancer screening:UTD      REVIEW OF SYSTEMS  Constitutional: Negative.    HENT: Negative.    Eyes: Negative.    Respiratory: Negative.    Cardiovascular: Negative.    Gastrointestinal: Negative.    Endocrine: Negative.    Genitourinary: Negative.    Musculoskeletal: Negative.  Skin: Negative.    Allergic/Immunologic: Negative.    Neurological: Negative.    Hematological: Negative.    Psychiatric/Behavioral: Negative.    I have reviewed the ROS as documented by the MA. Akshat Brooks MD       PHYSICAL EXAM    Vitals:    04/18/23 1443   BP: 116/64   Pulse: 71   Resp: 16   SpO2: 99%   Weight: 69.9 kg (154 lb)   Height: 165.1 cm (65\")     BMI is 25.6     GENERAL: alert and oriented, afebrile and vital signs stable  HEENT: oral mucosa moist, PEERLA, EOM, conjunctiva normal  No cervical adenopathy  LUNGS: clear to ascultation bilaterally, no rales, ronchi or wheezing  HEART: RRR S1 S2 without murmers, thrills, rubs or gallops  CHEST WALL: within normal limits, no tenderness  ABDOMEN: WNL. Normal BS.  EXTREMITIES: No clubbing, cyanosis or edema noted. Normal Pulses.  SKIN: warm, dry, no rashes noted  NEURO: CN II- XII grossly intact  PSYCH: Good mood and positive affect  ASSESSMENT AND PLAN  Problem List Items Addressed This Visit    None  Visit Diagnoses     Routine general medical examination at a health care facility    -  Primary        Routine health " maintenance reviewed and discussed with Eileen.  Labs reviewed and on the chart.  No orders of the defined types were placed in this encounter.     Return in about 1 year (around 4/18/2024).

## 2023-04-18 NOTE — PATIENT INSTRUCTIONS
Your Annual Physical  Personal Prevention Plan      Date of Office Visit:    Encounter Provider:  Akshat Brooks MD  Place of Service:  [facility]  Patient Name: Eileen Johnston  :  1975    As part of the Annual Physical portion of your visit today, we are providing you with this personalized preventive plan of services (PPPS). This plan is based upon recommendations of the [place] Preventive Services Task Force (USPSTF) and the Advisory Committee on Immunization Practices (ACIP).    This lists the preventive care services that should be considered, and provides dates of when you are due. Items listed as completed are up-to-date and do not require any further intervention.    Health Maintenance   Topic Date Due    COVID-19 Vaccine (4 - Booster for Pfizer series) 2022    INFLUENZA VACCINE  2023    ANNUAL PHYSICAL  2024    PAP SMEAR  2025    COLORECTAL CANCER SCREENING  2025    TDAP/TD VACCINES (3 - Td or Tdap) 2027    HEPATITIS C SCREENING  Completed    Pneumococcal Vaccine 0-64  Aged Out       No orders of the defined types were placed in this encounter.      Return in about 1 year (around 2024).

## 2023-07-24 ENCOUNTER — APPOINTMENT (OUTPATIENT)
Dept: WOMENS IMAGING | Facility: HOSPITAL | Age: 48
End: 2023-07-24
Payer: COMMERCIAL

## 2023-07-24 PROCEDURE — 77067 SCR MAMMO BI INCL CAD: CPT | Performed by: RADIOLOGY

## 2023-07-24 PROCEDURE — 77063 BREAST TOMOSYNTHESIS BI: CPT | Performed by: RADIOLOGY

## 2023-10-23 ENCOUNTER — TELEPHONE (OUTPATIENT)
Dept: FAMILY MEDICINE CLINIC | Facility: CLINIC | Age: 48
End: 2023-10-23
Payer: COMMERCIAL

## 2023-10-24 ENCOUNTER — TELEPHONE (OUTPATIENT)
Dept: FAMILY MEDICINE CLINIC | Facility: CLINIC | Age: 48
End: 2023-10-24

## 2023-10-24 ENCOUNTER — CLINICAL SUPPORT (OUTPATIENT)
Dept: FAMILY MEDICINE CLINIC | Facility: CLINIC | Age: 48
End: 2023-10-24
Payer: COMMERCIAL

## 2023-10-24 DIAGNOSIS — R05.3 CHRONIC COUGH: Primary | ICD-10-CM

## 2023-10-24 LAB
EXPIRATION DATE: NORMAL
FLUAV AG UPPER RESP QL IA.RAPID: NOT DETECTED
FLUBV AG UPPER RESP QL IA.RAPID: NOT DETECTED
INTERNAL CONTROL: NORMAL
Lab: NORMAL
SARS-COV-2 AG UPPER RESP QL IA.RAPID: NOT DETECTED

## 2023-10-24 RX ORDER — AZITHROMYCIN 250 MG/1
TABLET, FILM COATED ORAL
Qty: 6 TABLET | Refills: 0 | Status: SHIPPED | OUTPATIENT
Start: 2023-10-24

## 2023-10-24 NOTE — TELEPHONE ENCOUNTER
"She has been sick for 2 weeks.  I know you have spoken to her on multiple occasions.  Her Covid test is Negative.  She thinks \"she needs something\" because she is getting worse.  "

## 2024-05-09 DIAGNOSIS — Z00.00 ROUTINE GENERAL MEDICAL EXAMINATION AT A HEALTH CARE FACILITY: ICD-10-CM

## 2024-05-09 DIAGNOSIS — Z13.220 SCREENING FOR LIPOID DISORDERS: ICD-10-CM

## 2024-05-09 DIAGNOSIS — E05.00 GRAVES DISEASE: Primary | ICD-10-CM

## 2024-05-10 LAB
ALBUMIN SERPL-MCNC: 4.4 G/DL (ref 3.5–5.2)
ALBUMIN/GLOB SERPL: 1.6 G/DL
ALP SERPL-CCNC: 63 U/L (ref 39–117)
ALT SERPL-CCNC: 10 U/L (ref 1–33)
AST SERPL-CCNC: 17 U/L (ref 1–32)
BILIRUB SERPL-MCNC: 0.6 MG/DL (ref 0–1.2)
BUN SERPL-MCNC: 9 MG/DL (ref 6–20)
BUN/CREAT SERPL: 13.6 (ref 7–25)
CALCIUM SERPL-MCNC: 9 MG/DL (ref 8.6–10.5)
CHLORIDE SERPL-SCNC: 104 MMOL/L (ref 98–107)
CHOLEST SERPL-MCNC: 174 MG/DL (ref 0–200)
CO2 SERPL-SCNC: 26.7 MMOL/L (ref 22–29)
CREAT SERPL-MCNC: 0.66 MG/DL (ref 0.57–1)
EGFRCR SERPLBLD CKD-EPI 2021: 107 ML/MIN/1.73
ERYTHROCYTE [DISTWIDTH] IN BLOOD BY AUTOMATED COUNT: 12.4 % (ref 12.3–15.4)
GLOBULIN SER CALC-MCNC: 2.8 GM/DL
GLUCOSE SERPL-MCNC: 91 MG/DL (ref 65–99)
HCT VFR BLD AUTO: 40.2 % (ref 34–46.6)
HDLC SERPL-MCNC: 72 MG/DL (ref 40–60)
HGB BLD-MCNC: 13.1 G/DL (ref 12–15.9)
LDLC SERPL CALC-MCNC: 91 MG/DL (ref 0–100)
LDLC/HDLC SERPL: 1.26 {RATIO}
MCH RBC QN AUTO: 29 PG (ref 26.6–33)
MCHC RBC AUTO-ENTMCNC: 32.6 G/DL (ref 31.5–35.7)
MCV RBC AUTO: 89.1 FL (ref 79–97)
PLATELET # BLD AUTO: 235 10*3/MM3 (ref 140–450)
POTASSIUM SERPL-SCNC: 4.5 MMOL/L (ref 3.5–5.2)
PROT SERPL-MCNC: 7.2 G/DL (ref 6–8.5)
RBC # BLD AUTO: 4.51 10*6/MM3 (ref 3.77–5.28)
SODIUM SERPL-SCNC: 142 MMOL/L (ref 136–145)
TRIGL SERPL-MCNC: 56 MG/DL (ref 0–150)
TSH SERPL DL<=0.005 MIU/L-ACNC: 1.6 UIU/ML (ref 0.27–4.2)
VLDLC SERPL CALC-MCNC: 11 MG/DL (ref 5–40)
WBC # BLD AUTO: 5.32 10*3/MM3 (ref 3.4–10.8)

## 2024-05-31 ENCOUNTER — OFFICE VISIT (OUTPATIENT)
Dept: FAMILY MEDICINE CLINIC | Facility: CLINIC | Age: 49
End: 2024-05-31
Payer: COMMERCIAL

## 2024-05-31 VITALS
WEIGHT: 151 LBS | OXYGEN SATURATION: 97 % | SYSTOLIC BLOOD PRESSURE: 110 MMHG | BODY MASS INDEX: 25.16 KG/M2 | DIASTOLIC BLOOD PRESSURE: 64 MMHG | HEART RATE: 62 BPM | HEIGHT: 65 IN | RESPIRATION RATE: 16 BRPM

## 2024-05-31 DIAGNOSIS — Z12.11 COLON CANCER SCREENING: ICD-10-CM

## 2024-05-31 DIAGNOSIS — Z00.00 ROUTINE GENERAL MEDICAL EXAMINATION AT A HEALTH CARE FACILITY: Primary | ICD-10-CM

## 2024-05-31 DIAGNOSIS — Z23 NEED FOR VACCINATION: ICD-10-CM

## 2024-05-31 PROCEDURE — 90480 ADMN SARSCOV2 VAC 1/ONLY CMP: CPT | Performed by: FAMILY MEDICINE

## 2024-05-31 PROCEDURE — 91320 SARSCV2 VAC 30MCG TRS-SUC IM: CPT | Performed by: FAMILY MEDICINE

## 2024-05-31 PROCEDURE — 99396 PREV VISIT EST AGE 40-64: CPT | Performed by: FAMILY MEDICINE

## 2024-05-31 NOTE — PROGRESS NOTES
"Preventive Exam    History of Present Illness: Eileen is here for check up and review of routine health maintenance.  We addressed the following concerns:    1) Eileen is concerned that she may have hearing issue.  She would like some advice as to where to go to get evaluated.    2) She has noticed some spotting of her skin after a fall and injury a few months ago.  She states it is getting much better but still would like for me to evaluate her right thigh.  She no longer has any pain or discomfort but it was uncomfortable at first.       Pap Smear:UTD  Mammogram:UTD  Colon cancer screening: ordered  Tobacco use :non smoker        REVIEW OF SYSTEMS  Constitutional: Negative.    HENT: Negative.    Eyes: Negative.    Respiratory: Negative.    Cardiovascular: Negative.    Gastrointestinal: Negative.    Endocrine: Negative.    Genitourinary: Negative.    Musculoskeletal: Negative.  Skin: Negative.    Allergic/Immunologic: Negative.    Neurological: Negative.    Hematological: Negative.    Psychiatric/Behavioral: Negative.    I have reviewed the ROS as documented by the MA. Akshat Brooks MD       PHYSICAL EXAM    Vitals:    05/31/24 0937   BP: 110/64   Pulse: 62   Resp: 16   SpO2: 97%   Weight: 68.5 kg (151 lb)   Height: 165.1 cm (65\")     GENERAL: alert and oriented, afebrile and vital signs stable  HEENT: oral mucosa moist, PEERLA, EOM, conjunctiva normal  No cervical adenopathy  LUNGS: clear to ascultation bilaterally, no rales, ronchi or wheezing  HEART: RRR S1 S2 without murmers, thrills, rubs or gallops  CHEST WALL: within normal limits, no tenderness  ABDOMEN: WNL. Normal BS.  EXTREMITIES: No clubbing, cyanosis or edema noted. Normal Pulses.  SKIN: warm, dry, no rashes noted  NEURO: CN II- XII grossly intact  PSYCH: Good mood and positive affect  ASSESSMENT AND PLAN  Problem List Items Addressed This Visit    None  Visit Diagnoses       Routine general medical examination at a health care facility    -  " Primary    Need for vaccination        Relevant Orders    COVID-19 F23 (Pfizer) 12yrs+ (COMIRNATY)    Colon cancer screening        Relevant Orders    Ambulatory Referral For Screening Colonoscopy        Advised local [employer] for initial hearing evaluation.  Advised her that this is company that we will do this without charging her unless she needs further help.  Evaluated right upper thigh, lateral portion.  There is some mottling but it seems to be fading and she states she has no pain with palpation.  Encouraged her to continue to monitor and let me know if she cannot feel it is healing well.      Routine health maintenance reviewed and discussed with Eileen.  BMI is >= 25 and <30. (Overweight) The following options were offered after discussion;: weight loss educational material (shared in after visit summary) and exercise counseling/recommendations     Orders Placed This Encounter   Procedures    COVID-19 F23 (Pfizer) 12yrs+ (COMIRNATY)    Ambulatory Referral For Screening Colonoscopy      Return in about 1 year (around 5/31/2025) for Annual physical.

## 2024-05-31 NOTE — PATIENT INSTRUCTIONS
Your Annual Physical  Personal Prevention Plan      Date of Office Visit:    Encounter Provider:  Akshat Brooks MD  Place of Service:  [facility]  Patient Name: Eileen Johnston  :  1975    As part of the Annual Physical portion of your visit today, we are providing you with this personalized preventive plan of services (PPPS). This plan is based upon recommendations of the [place] Preventive Services Task Force (USPSTF) and the Advisory Committee on Immunization Practices (ACIP).    This lists the preventive care services that should be considered, and provides dates of when you are due. Items listed as completed are up-to-date and do not require any further intervention.    Health Maintenance   Topic Date Due    COVID-19 Vaccine ( - - season) 2023    ZOSTER VACCINE (1 of 2) 2024 (Originally 2023)    INFLUENZA VACCINE  2024    ANNUAL PHYSICAL  2025    BMI FOLLOWUP  2025    MAMMOGRAM  2025    COLORECTAL CANCER SCREENING  2025    PAP SMEAR  2026    TDAP/TD VACCINES (3 - Td or Tdap) 2027    HEPATITIS C SCREENING  Completed    Pneumococcal Vaccine 0-64  Aged Out       Orders Placed This Encounter   Procedures    COVID-19 F23 (Pfizer) 12yrs+ (COMIRNATY)    Ambulatory Referral For Screening Colonoscopy     Referral Priority:   Routine     Referral Type:   Diagnostic Medical     Referral Reason:   Specialty Services Required     Referred to Provider:   Marisabel Choi MD     Number of Visits Requested:   1       Return in about 1 year (around 2025) for Annual physical.

## 2024-06-28 ENCOUNTER — TELEPHONE (OUTPATIENT)
Dept: SURGERY | Facility: CLINIC | Age: 49
End: 2024-06-28
Payer: COMMERCIAL

## 2024-06-28 NOTE — TELEPHONE ENCOUNTER
Message left on patient's voicemail to call Aimee at the office.  I was calling to schedule a colonoscopy.  Patient was given my office number to call.

## 2024-07-11 ENCOUNTER — OFFICE VISIT (OUTPATIENT)
Dept: OBSTETRICS AND GYNECOLOGY | Age: 49
End: 2024-07-11
Payer: COMMERCIAL

## 2024-07-11 VITALS
BODY MASS INDEX: 25.33 KG/M2 | DIASTOLIC BLOOD PRESSURE: 74 MMHG | SYSTOLIC BLOOD PRESSURE: 124 MMHG | WEIGHT: 152 LBS | HEIGHT: 65 IN

## 2024-07-11 DIAGNOSIS — Z01.419 ENCOUNTER FOR GYNECOLOGICAL EXAMINATION WITHOUT ABNORMAL FINDING: Primary | ICD-10-CM

## 2024-07-11 DIAGNOSIS — Z11.51 SCREENING FOR HUMAN PAPILLOMAVIRUS (HPV): ICD-10-CM

## 2024-07-11 DIAGNOSIS — Z12.31 SCREENING MAMMOGRAM FOR BREAST CANCER: ICD-10-CM

## 2024-07-11 DIAGNOSIS — Z01.419 WELL FEMALE EXAM WITH ROUTINE GYNECOLOGICAL EXAM: ICD-10-CM

## 2024-07-11 DIAGNOSIS — Z12.4 SCREENING FOR MALIGNANT NEOPLASM OF CERVIX: ICD-10-CM

## 2024-07-11 NOTE — PROGRESS NOTES
Routine Annual Visit    2024    Patient: Eileen Johnston          MR#:[id number]      Chief Complaint   Patient presents with    Gynecologic Exam     Annual Exam - last pap 23 (-)/HPV(+), mammo 23, cologuard  (-), pt is not having monthly menses, pt has no complaints today       History of Present Illness    48 y.o. female  who presents for annual exam.     Patient is having irregular cycles  She does have hot flashes and night sweats  She is likely perimenopausal  Pap smear is due  Mammogram is scheduled and order is placed  Cologuard is up-to-date  Patient is still considering pregnancy with a donor egg  No other complaints      No LMP recorded.  Obstetric History:  OB History          0    Para   0    Term   0       0    AB   0    Living   0         SAB   0    IAB   0    Ectopic   0    Molar   0    Multiple   0    Live Births   0               Menstrual History:     No LMP recorded.       Sexual History:       ________________________________________  Patient Active Problem List   Diagnosis    Graves disease    Crushing injury of finger of right hand       Past Medical History:   Diagnosis Date    Cervical dysplasia     COVID     Graves disease     followed by Dr. Joy    HPV (human papilloma virus) infection     Hyperthyroidism 5987-9046; -present    Mild Graves Disease; levels normal now    LGSIL on Pap smear of cervix     Urinary tract infection     not for several years    Varicella age 5       Past Surgical History:   Procedure Laterality Date    BREAST BIOPSY      BREAST SURGERY Left     Titanium markers from bx    CERVICAL BIOPSY  W/ LOOP ELECTRODE EXCISION      COLPOSCOPY W/ BIOPSY / CURETTAGE      MAMMO CORE NEEDLE BIOPSY UNILATERAL      MOUTH SURGERY      WISDOM TOOTH EXTRACTION         Social History     Tobacco Use   Smoking Status Never   Smokeless Tobacco Never   Tobacco Comments    No caffeine use        currently has no medications in their medication  "list.  ________________________________________    Current contraception: none  History of abnormal Pap smear: yes - previous leep  Family history of Breast cancer: no  Family history of uterine or ovarian cancer: no  Family History of colon cancer/colon polyps: no  History of abnormal mammogram: no      The following portions of the patient's history were reviewed and updated as appropriate: allergies, current medications, past family history, past medical history, past social history, past surgical history, and problem list.    Review of Systems    Pertinent items are noted in HPI.     Objective   Physical Exam    /74   Ht 165.1 cm (65\")   Wt 68.9 kg (152 lb)   BMI 25.29 kg/m²    BP Readings from Last 3 Encounters:   07/11/24 124/74   05/31/24 110/64   06/26/23 118/76      Wt Readings from Last 3 Encounters:   07/11/24 68.9 kg (152 lb)   05/31/24 68.5 kg (151 lb)   06/26/23 69.4 kg (153 lb)      BMI: Estimated body mass index is 25.29 kg/m² as calculated from the following:    Height as of this encounter: 165.1 cm (65\").    Weight as of this encounter: 68.9 kg (152 lb).      General:   alert, appears stated age, and cooperative   Abdomen: soft, non-tender, without masses or organomegaly   Breast: inspection negative, no nipple discharge or bleeding, no masses or nodularity palpable   Vulva: normal   Vagina: normal mucosa   Cervix: no cervical motion tenderness and no lesions   Uterus: normal size, mobile, and non-tender   Adnexa: no mass, fullness, tenderness     Assessment:    1. Normal annual exam   Assessment     ICD-10-CM ICD-9-CM   1. Encounter for gynecological examination without abnormal finding  Z01.419 V72.31   2. Well female exam with routine gynecological exam  Z01.419 V72.31   3. Screening for human papillomavirus (HPV)  Z11.51 V73.81   4. Screening for malignant neoplasm of cervix  Z12.4 V76.2   5. Screening mammogram for breast cancer  Z12.31 V76.12     Plan:    Plan     [x]  Mammogram " request made  [x]  PAP done  []  Labs:   []  GC/Chl/TV  []  DEXA scan   []  Referral for colonoscopy:       Diagnoses and all orders for this visit:    1. Encounter for gynecological examination without abnormal finding (Primary)    2. Well female exam with routine gynecological exam  -     IGP, Apt HPV,rfx 16 / 18,45    3. Screening for human papillomavirus (HPV)  -     IGP, Apt HPV,rfx 16 / 18,45    4. Screening for malignant neoplasm of cervix  -     IGP, Apt HPV,rfx 16 / 18,45    5. Screening mammogram for breast cancer  -     Mammo Screening Digital Tomosynthesis Bilateral With CAD; Future            Counseling:  --Nutrition: Stressed importance of moderation and caloric balance, stressed fresh fruit and vegetables  --Exercise: Stressed the importance of regular exercise. 3-5 times weekly   - Discussed screening mammogram recommendations.   --Discussed benefits of screening colonoscopy- age 45 unless FH  --Discussed pap smear screening recommendations

## 2024-07-14 LAB
CYTOLOGIST CVX/VAG CYTO: NORMAL
CYTOLOGY CVX/VAG DOC CYTO: NORMAL
CYTOLOGY CVX/VAG DOC THIN PREP: NORMAL
DX ICD CODE: NORMAL
HPV I/H RISK 4 DNA CVX QL PROBE+SIG AMP: NEGATIVE
Lab: NORMAL
OTHER STN SPEC: NORMAL
STAT OF ADQ CVX/VAG CYTO-IMP: NORMAL

## 2024-08-23 DIAGNOSIS — S82.891B: Primary | ICD-10-CM

## 2024-08-26 ENCOUNTER — OFFICE VISIT (OUTPATIENT)
Dept: ORTHOPEDIC SURGERY | Facility: CLINIC | Age: 49
End: 2024-08-26
Payer: COMMERCIAL

## 2024-08-26 VITALS — HEIGHT: 65 IN | BODY MASS INDEX: 25.33 KG/M2 | WEIGHT: 152 LBS | TEMPERATURE: 97.8 F

## 2024-08-26 DIAGNOSIS — S92.155A CLOSED NONDISPLACED AVULSION FRACTURE OF LEFT TALUS, INITIAL ENCOUNTER: ICD-10-CM

## 2024-08-26 DIAGNOSIS — S86.301A INJURY OF PERONEAL TENDON OF RIGHT FOOT, INITIAL ENCOUNTER: Primary | ICD-10-CM

## 2024-08-26 DIAGNOSIS — S93.401A SPRAIN OF RIGHT ANKLE, UNSPECIFIED LIGAMENT, INITIAL ENCOUNTER: ICD-10-CM

## 2024-08-26 DIAGNOSIS — R52 PAIN: ICD-10-CM

## 2024-08-26 PROCEDURE — 99204 OFFICE O/P NEW MOD 45 MIN: CPT | Performed by: ORTHOPAEDIC SURGERY

## 2024-08-26 PROCEDURE — 73610 X-RAY EXAM OF ANKLE: CPT | Performed by: ORTHOPAEDIC SURGERY

## 2024-08-26 NOTE — PROGRESS NOTES
New Patient Complaint      Patient: Eileen Johnston  YOB: 1975 48 y.o. female  Medical Record Number: [id number]    Chief Complaints: I hurt my ankle    History of Present Illness: Patient injured her right ankle in [place] on 8/21/2024 while attending [place] [employer] [place].  She missed the edge of the sidewalk and twisted her ankle.  She had pain and swelling and difficulty bearing weight.  She had no prior history of injury did not clearly feel or hear a pop.  She had x-rays done while in [place] which showed evidently a small avulsion to the lateral hindfoot.  She has been in a short boot but has been essentially nonweightbearing using a scooter.  She reports moderate pain along the lateral aspect of the right hindfoot along the peroneal tendons and anterolateral ankle but really not appreciable pain medially.    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         Patient is seen today at the request of Dr. Akshat Brooks who has requested my opinion regarding etiology and treatment of this condition    HPI    Allergies: No Known Allergies    Medications:   No current outpatient medications on file prior to visit.     No current facility-administered medications on file prior to visit.       Past Medical History:   Diagnosis Date    Ankle sprain 8/21/24    Cervical dysplasia     COVID     Graves disease     followed by Dr. Joy    HPV (human papilloma virus) infection     Hyperthyroidism 7394-0018; 2021-present    Mild Graves Disease; levels normal now    LGSIL on Pap smear of cervix     Urinary tract infection     not for several years    Varicella age 5     Past Surgical History:   Procedure Laterality Date    BREAST BIOPSY  2021    BREAST SURGERY Left     Titanium markers from bx    CERVICAL BIOPSY  W/ LOOP ELECTRODE EXCISION      COLPOSCOPY W/ BIOPSY / CURETTAGE      HAND SURGERY  2018    Right index    MAMMO CORE NEEDLE BIOPSY UNILATERAL      MOUTH SURGERY      WISDOM TOOTH  EXTRACTION       Social History     Occupational History    Occupation:    Tobacco Use    Smoking status: Never    Smokeless tobacco: Never    Tobacco comments:     No caffeine use    Vaping Use    Vaping status: Never Used   Substance and Sexual Activity    Alcohol use: Yes     Alcohol/week: 3.0 standard drinks of alcohol     Types: 3 Glasses of wine per week     Comment: 3-4/week    Drug use: No    Sexual activity: Not Currently     Partners: Male     Birth control/protection: Condom      Social History     Social History Narrative    Lives in own home     Family History   Problem Relation Age of Onset    Arthritis Mother         hands    Alcohol abuse Father         alcohol free for over 10 years    Diabetes Father         no longer after losing 100 lbs post bypass    Hyperlipidemia Father         no longer after losing 100 lbs following bypass    Hypertension Father         no longer after losing 100 lbs following bypass    Heart disease Father         bypass    Coronary artery disease Father         Bypass (5)    Cancer Father         lung cancer; heavy smoker    Miscarriages / Stillbirths Sister         2 miscarriages    No Known Problems Maternal Aunt     No Known Problems Paternal Aunt     Thyroid disease Paternal Aunt         hypothyrodism    Kidney cancer Maternal Grandmother     Cancer Maternal Grandmother         kidney cancer; kidney removed    Hyperlipidemia Maternal Grandmother     Hypertension Maternal Grandmother     Stroke Maternal Grandmother         cause of death at 92    Thyroid disease Maternal Grandmother         hypothyroidism    Cancer Maternal Grandfather         prostate cancer    Prostate cancer Maternal Grandfather     No Known Problems Paternal Grandmother     Heart disease Paternal Grandfather         bypass    Cancer Paternal Grandfather         skin cancer    Diabetes Paternal Grandfather     Coronary artery disease Paternal Grandfather         Bypass    BRCA 1/2 Neg Hx      "Breast cancer Neg Hx     Colon cancer Neg Hx     Endometrial cancer Neg Hx     Ovarian cancer Neg Hx        Review of Systems: 14 point review of systems performed, positive pertinent findings identified in HPI. All remaining systems negative     Review of Systems      Physical Exam:   Vitals:    08/26/24 1456   Temp: 97.8 °F (36.6 °C)   Weight: 68.9 kg (152 lb)   Height: 165.1 cm (65\")   PainSc:   6   PainLoc: Ankle     Physical Exam   Constitutional: pleasant, well developed   Eyes: sclera non icteric  Hearing : adequate for exam  Cardiovascular: palpable pulses in right foot, right calf/ thigh NT without sign of DVT  Respiratoy: breathing unlabored   Neurological: grossly sensate to LT throughout right LE  Psychiatric: oriented with normal mood and affect.   Lymphatic: No palpable popliteal lymphadenopathy right LE  Skin: intact throughout right leg/foot  Musculoskeletal: There is moderate tenderness to palpation along the posterior lateral aspect of the ankle with some fullness along the peroneal tendons without gross subluxation but pain on resisted eversion.  She had tenderness over the anterolateral ankle but really not appreciable pain medially.  There was some swelling and ecchymosis to the medial hindfoot and inferolateral right hindfoot.  Physical Exam  Ortho Exam    Radiology: 3 views of the right ankle ordered evaluate fracture reviewed and no prior x-ray images available for comparison but reviewed report of x-ray of right ankle from 8/23/2024 which describes a distal fibular avulsion fracture    Today's x-rays do not show any malalignment of the talus with the mortise there is a very small марина of bone lateral to the lateral process of the talus that could be from the distal fibula or more likely from the lateral process of the talus at the insertion of the ATFL    Assessment/Plan: 1.  Right ankle anterolateral ligamentous injury with likely avulsion fracture at the origin of the ATFL  2.  Right " ankle possible peroneal tendon tear    We discussed treatment going forward and she was switched out to a taller boot and fitted with crutches and may do partial weightbearing otherwise we will use her scooter.    She will continue with elevation and I think it is imperative to go ahead and get an MRI for further evaluation of peroneal tendon tear that may require surgical treatment could be made worse with therapy.    She was fitted with an air stirrup brace for sleeping and we will see her back in about 3 weeks to review MRI assess progress and determine treatment course going forward.

## 2024-08-28 ENCOUNTER — PATIENT ROUNDING (BHMG ONLY) (OUTPATIENT)
Dept: ORTHOPEDIC SURGERY | Facility: CLINIC | Age: 49
End: 2024-08-28
Payer: COMMERCIAL

## 2024-08-28 NOTE — PROGRESS NOTES
A CMGE Message has been sent to the patient for PATIENT ROUNDING with [facility]

## 2024-09-04 ENCOUNTER — HOSPITAL ENCOUNTER (OUTPATIENT)
Dept: MRI IMAGING | Facility: HOSPITAL | Age: 49
Discharge: HOME | End: 2024-09-04
Admitting: ORTHOPAEDIC SURGERY
Payer: COMMERCIAL

## 2024-09-04 DIAGNOSIS — S93.401A SPRAIN OF RIGHT ANKLE, UNSPECIFIED LIGAMENT, INITIAL ENCOUNTER: ICD-10-CM

## 2024-09-04 DIAGNOSIS — S92.155A CLOSED NONDISPLACED AVULSION FRACTURE OF LEFT TALUS, INITIAL ENCOUNTER: ICD-10-CM

## 2024-09-04 DIAGNOSIS — S86.301A INJURY OF PERONEAL TENDON OF RIGHT FOOT, INITIAL ENCOUNTER: ICD-10-CM

## 2024-09-04 PROCEDURE — 73721 MRI JNT OF LWR EXTRE W/O DYE: CPT

## 2024-09-05 ENCOUNTER — TELEPHONE (OUTPATIENT)
Dept: ORTHOPEDIC SURGERY | Facility: CLINIC | Age: 49
End: 2024-09-05
Payer: COMMERCIAL

## 2024-09-05 NOTE — TELEPHONE ENCOUNTER
I returned patient's message and spoke with her as she was inquiring about MRI results.  Reviewed these thoroughly with her including all aspects of it and reviewed there was nothing I would recommend from a surgical standpoint.  She said her ankle had been feeling better until she saw the report but addressed her concerns and reviewed there is nothing I would recommend from a surgical standpoint at this time.  She said her ankle has been feeling better and swelling is diminished.  She will continue with her boot and do partial weightbearing.  She is to be seen back in about 2 weeks but not sure she can make it because she may be out of town.  She will at least let me know how she is doing at that point and we may be able to transition her to the stirrup brace that she has been sleeping in and begin some physical therapy prior to her follow-up appointment.  She appreciated the call and was encouraged to call if she has any further questions or concerns.

## 2024-09-10 ENCOUNTER — PREP FOR SURGERY (OUTPATIENT)
Dept: OTHER | Facility: HOSPITAL | Age: 49
End: 2024-09-10
Payer: COMMERCIAL

## 2024-09-10 ENCOUNTER — PATIENT MESSAGE (OUTPATIENT)
Dept: ORTHOPEDIC SURGERY | Facility: CLINIC | Age: 49
End: 2024-09-10
Payer: COMMERCIAL

## 2024-09-10 DIAGNOSIS — Z12.11 SCREENING FOR COLON CANCER: Primary | ICD-10-CM

## 2024-09-11 PROBLEM — Z12.11 SCREENING FOR COLON CANCER: Status: ACTIVE | Noted: 2024-09-10

## 2024-09-13 ENCOUNTER — OFFICE VISIT (OUTPATIENT)
Dept: ORTHOPEDIC SURGERY | Facility: CLINIC | Age: 49
End: 2024-09-13
Payer: COMMERCIAL

## 2024-09-13 VITALS — TEMPERATURE: 97.8 F | BODY MASS INDEX: 25.66 KG/M2 | HEIGHT: 65 IN | WEIGHT: 154 LBS

## 2024-09-13 DIAGNOSIS — S92.101D CLOSED NONDISPLACED FRACTURE OF RIGHT TALUS WITH ROUTINE HEALING, UNSPECIFIED PORTION OF TALUS, SUBSEQUENT ENCOUNTER: ICD-10-CM

## 2024-09-13 DIAGNOSIS — S82.54XA CLOSED NONDISPLACED FRACTURE OF MEDIAL MALLEOLUS OF RIGHT TIBIA, INITIAL ENCOUNTER: ICD-10-CM

## 2024-09-13 DIAGNOSIS — S93.401D SPRAIN OF RIGHT ANKLE, UNSPECIFIED LIGAMENT, SUBSEQUENT ENCOUNTER: Primary | ICD-10-CM

## 2024-09-13 NOTE — PROGRESS NOTES
"Ankle Follow Up      Patient: Eileen Johnston    YOB: 1975 48 y.o. female    Chief Complaints: Ankle \"feeling better\"    History of Present Illness:Patient was seen on 8/26/2024 reporting that she injured her right ankle in [place] on 8/21/2024 while attending the [employer] [place].  She missed the edge of the sidewalk and twisted her ankle.  She had pain and swelling and difficulty bearing weight.  She had no prior history of injury did not clearly feel or hear a pop.  She had x-rays done while in [place] which showed evidently a small avulsion to the lateral hindfoot.  She had been in a short boot but had been essentially nonweightbearing using a scooter.  She report did moderate pain along the lateral aspect of the right hindfoot along the peroneal tendons and anterolateral ankle but really not appreciable pain medially.     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    I was concerned about peroneal tendon injury she was switched out to a tall boot and fitted with crutches and allowed partial weightbearing or instructed to use her scooter.  She was sent for MRI for further evaluation and fitted with an air stirrup brace for sleeping.    I discussed her MRI results with her on 9/5/2024.  She reported that her ankle was feeling better and swelling was diminished she was instructed continue with her boot and partial weightbearing patient was not sure if she was going to be able to come in in 2 weeks to be seen.  I told her that at that point when she was at 2 weeks she could possibly transition to her stirrup brace that she been sleeping in and begin some physical therapy prior to appointment and she was going to let me know how she was doing    Patient messaged on 9/10/2024 updating me that she was on raising her scooter and was wearing her boot except for sleep and was either walking with crutches or just the boot.  She reports she had no swelling in the morning and is little " "bit by the end of the day with some tenderness and slight pain at the outer aspect of the ankle and wonders know if she can start rehab.  She asked to be worked in for further evaluation which I was happy to accommodate.    Patient is seen back today stating that she is getting better swelling is diminished she has only some mild pain over the anterolateral ankle and anterior ankle mainly when she plantar flexes with some discomfort on the peroneal musculature but none along the medial ankle at all or posterior lateral ankle.  She been using her boot and 2 crutches majority of the time going occasionally in the house with just her boot and tolerating this.  Pain is rated 1 out of 10     HPI    ROS: ankle pain  Past Medical History:   Diagnosis Date    Ankle sprain 8/21/24    Cervical dysplasia     COVID     Graves disease     followed by Dr. Joy    HPV (human papilloma virus) infection     Hyperthyroidism 8253-2229; 2021-present    Mild Graves Disease; levels normal now    LGSIL on Pap smear of cervix     Urinary tract infection     not for several years    Varicella age 5       Physical Exam:   Vitals:    09/13/24 1014   Temp: 97.8 °F (36.6 °C)   Weight: 69.9 kg (154 lb)   Height: 165.1 cm (65\")   PainSc:   1   PainLoc: Ankle     Well developed with normal mood.  There is mild swelling only to the right ankle.  She had mild tenderness over the anterolateral ankle but no gross instability on anterior drawer but with some guarding but with no focal pain.  She had mild discomfort in each ankle with maximum plantarflexion was nontender on the peroneal tendons in the retromalleolar area with some discomfort on the peroneal musculature.  No tenderness at all the medial ankle or distal medial tibia.      Radiology: MRI films and report of the right ankle dated 9/4/2024 reviewed which show focal nondisplaced subcortical impaction fractures anteromedially left medial talar head and neck with tiny avulsion fracture from the " lateral process of the talus that was better appreciated on prior radiographs.  There was close abutment of the anterior process calcaneus and lateral navicular without evidence of osseous coalition.  There was posttraumatic tibiotalar joint effusion and suspected partial thickness injury of the anterior joint capsule and likely posttraumatic posterior subtalar joint effusion.  Peroneal tendons were intact.  There is moderate to high-grade partial-thickness pain of the ATFL.  CFL was intact as was the deltoid and syndesmosis.      Assessment/Plan: 1.  Right ankle anterolateral ligamentous injury of the ATFL  2.  Right ankle focal nondisplaced subcortical impaction fractures anterior medial talus and medial talar head and neck  3.  Right ankle tiny avulsion fracture from lateral process of the talus  4.  Right ankle posttraumatic tibiotalar joint effusion with suspected partial thickness injury to the anterior joint capsule  5.  Right ankle posttraumatic posterior subtalar joint effusion    We discussed treatment going forward and overall seems to be doing well and would not recommend any further workup or surgical treatment.    Will have her transition to 1 crutch and her boot over the next week and then start transitioning to an a ASO in the house and boot out of house for a week if doing well then ASO in and out of the house still with 1 crutch or cane.    Also get her started in some physical therapy to work on strengthening and stabilization.    Anything worsens she will let me know otherwise I will see her back in 3 weeks no x-rays unless she is having increased pain.

## 2024-09-17 ENCOUNTER — TREATMENT (OUTPATIENT)
Age: 49
End: 2024-09-17
Payer: COMMERCIAL

## 2024-09-17 DIAGNOSIS — Z74.09 IMPAIRED FUNCTIONAL MOBILITY, BALANCE, GAIT, AND ENDURANCE: ICD-10-CM

## 2024-09-17 DIAGNOSIS — S82.54XS CLOSED NONDISPLACED FRACTURE OF MEDIAL MALLEOLUS OF RIGHT TIBIA, SEQUELA: ICD-10-CM

## 2024-09-17 DIAGNOSIS — M25.671 DECREASED RANGE OF MOTION OF RIGHT ANKLE: ICD-10-CM

## 2024-09-17 DIAGNOSIS — S93.401D SPRAIN OF RIGHT ANKLE, UNSPECIFIED LIGAMENT, SUBSEQUENT ENCOUNTER: Primary | ICD-10-CM

## 2024-09-17 DIAGNOSIS — S92.101D CLOSED NONDISPLACED FRACTURE OF RIGHT TALUS WITH ROUTINE HEALING, UNSPECIFIED PORTION OF TALUS, SUBSEQUENT ENCOUNTER: ICD-10-CM

## 2024-09-17 PROCEDURE — 97530 THERAPEUTIC ACTIVITIES: CPT | Performed by: PHYSICAL THERAPIST

## 2024-09-17 PROCEDURE — 97110 THERAPEUTIC EXERCISES: CPT | Performed by: PHYSICAL THERAPIST

## 2024-09-17 PROCEDURE — 97161 PT EVAL LOW COMPLEX 20 MIN: CPT | Performed by: PHYSICAL THERAPIST

## 2024-09-17 PROCEDURE — 97140 MANUAL THERAPY 1/> REGIONS: CPT | Performed by: PHYSICAL THERAPIST

## 2024-10-07 ENCOUNTER — TELEPHONE (OUTPATIENT)
Dept: ORTHOPEDIC SURGERY | Facility: CLINIC | Age: 49
End: 2024-10-07

## 2024-10-07 NOTE — TELEPHONE ENCOUNTER
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  She said her ankle is definitely better.  She is wearing the ASO brace when she is not in court but cannot wear tennis shoes in court so she is wearing her boot.  She is concerned about stiffness in her ankle due to wearing the boot but is afraid to go without the boot.  She does get some discomfort going up the steps without the boot on anterolaterally.  Counseled her on some range of motion exercises that she can try to google some ankle sprain therapy type exercises to do on her own.    She is also going to check at local [employer] for any type of lower profile elastic brace that she may be able to wear with reasonable shoes rather than tennis shoes in court.    She also may try going for part of the day without it in court and then take the boot with her if she needs it.    All of her questions were answered to her full satisfaction and reviewed with her I do not feel we need to do any more x-rays at this time and that she is improving overall and willask the office to set up a follow-up appointment with her when she returns.

## 2024-10-07 NOTE — TELEPHONE ENCOUNTER
----- Message from Eileen FERREIRA sent at 10/7/2024  2:24 PM EDT -----  Regarding: FW: Appointment Cancellation Request  Contact: [phone]  Please review and advise  ----- Message -----  From: Gonzalo Nova MD  Sent: 10/4/2024   4:30 PM EDT  To: Lynn Farmer MA; Eileen Carrizales MA  Subject: FW: Appointment Cancellation Request             Please call please let patient know that I do not really do telehealth visits but will be happy to discuss her progress with her on the telephone.  Please ask her to let us know when would be convenient for her.  I will be in the OR all day on 10/8/2024 and let her know I will be out of town from 10/10/2024 through 10/15/2024.  Thank you  ----- Message -----  From: Eileen Carrizales MA  Sent: 10/4/2024   4:23 PM EDT  To: Gonzalo Nova MD  Subject: FW: Appointment Cancellation Request             Please review and advise  ----- Message -----  From: Kina Jara RegSched Rep  Sent: 10/4/2024   4:13 PM EDT  To: [facility]  Subject: FW: Appointment Cancellation Request               ----- Message -----  From: Eileen Johnston  Sent: 10/4/2024   3:57 PM EDT  [facility]  Subject: Appointment Cancellation Request                 Eileen Johnston would like to cancel the following appointments:    Gonzalo Nova in [place] [facility] [address] ([id number]), 10/7/2024  2:50 PM    Comments:  Dr. Nova - my trial did start in [place] so I am in [place] for the entire month.  Could we set a telehealth appointment to check in?  Thanks.

## 2024-11-21 ENCOUNTER — TELEPHONE (OUTPATIENT)
Dept: ORTHOPEDIC SURGERY | Facility: CLINIC | Age: 49
End: 2024-11-21

## 2024-11-21 NOTE — TELEPHONE ENCOUNTER
Caller: Eileen Johnston    Relationship to patient: Self    Best call back number: [phone]    Chief complaint: RIGHT ANKLE    Type of visit: FOLLOW UP    Requested date: BEFORE 12/06/24     If rescheduling, when is the original appointment: 11/27/24     Additional notes: PT IS GOING TO BE OUT OF TOWN FOR THE 11/27/24 [place], PT NEEDS TO RS BEFORE 12/06/24 IF POSSIBLE..

## 2024-12-02 ENCOUNTER — OFFICE VISIT (OUTPATIENT)
Dept: ORTHOPEDIC SURGERY | Facility: CLINIC | Age: 49
End: 2024-12-02
Payer: COMMERCIAL

## 2024-12-02 ENCOUNTER — APPOINTMENT (OUTPATIENT)
Dept: WOMENS IMAGING | Facility: HOSPITAL | Age: 49
End: 2024-12-02
Payer: COMMERCIAL

## 2024-12-02 VITALS — HEIGHT: 65 IN | BODY MASS INDEX: 26.42 KG/M2 | WEIGHT: 158.6 LBS | TEMPERATURE: 98.2 F

## 2024-12-02 DIAGNOSIS — R52 PAIN: ICD-10-CM

## 2024-12-02 DIAGNOSIS — S93.401D SPRAIN OF RIGHT ANKLE, UNSPECIFIED LIGAMENT, SUBSEQUENT ENCOUNTER: Primary | ICD-10-CM

## 2024-12-02 DIAGNOSIS — M72.2 PLANTAR FASCIITIS: ICD-10-CM

## 2024-12-02 DIAGNOSIS — S92.154D CLOSED NONDISPLACED AVULSION FRACTURE OF RIGHT TALUS WITH ROUTINE HEALING, SUBSEQUENT ENCOUNTER: ICD-10-CM

## 2024-12-02 PROCEDURE — 77063 BREAST TOMOSYNTHESIS BI: CPT | Performed by: RADIOLOGY

## 2024-12-02 PROCEDURE — 77067 SCR MAMMO BI INCL CAD: CPT | Performed by: RADIOLOGY

## 2024-12-02 PROCEDURE — 99214 OFFICE O/P EST MOD 30 MIN: CPT | Performed by: ORTHOPAEDIC SURGERY

## 2024-12-02 NOTE — PROGRESS NOTES
"Ankle Follow Up      Patient: Eileen Johnston    YOB: 1975 48 y.o. female    Chief Complaints: Ankle \"not great.\"    History of Present Illness:Patient was seen on 8/26/2024 reporting that she injured her right ankle in [place] on 8/21/2024 while attending the [employer].  She missed the edge of the sidewalk and twisted her ankle.  She had pain and swelling and difficulty bearing weight.  She had no prior history of injury did not clearly feel or hear a pop.  She had x-rays done while in [place] which showed evidently a small avulsion to the lateral hindfoot.  She had been in a short boot but had been essentially nonweightbearing using a scooter.  She report did moderate pain along the lateral aspect of the right hindfoot along the peroneal tendons and anterolateral ankle but really not appreciable pain medially.     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     I was concerned about peroneal tendon injury she was switched out to a tall boot and fitted with crutches and allowed partial weightbearing or instructed to use her scooter.  She was sent for MRI for further evaluation and fitted with an air stirrup brace for sleeping.     I discussed her MRI results with her on 9/5/2024.  She reported that her ankle was feeling better and swelling was diminished she was instructed continue with her boot and partial weightbearing patient was not sure if she was going to be able to come in in 2 weeks to be seen.  I told her that at that point when she was at 2 weeks she could possibly transition to her stirrup brace that she been sleeping in and begin some physical therapy prior to appointment and she was going to let me know how she was doing     Patient messaged on 9/10/2024 updating me that she was no longer her scooter and was wearing her boot except for sleep and was either walking with crutches or just the boot.  She reported that she had no swelling in the morning and had " "little bit by the end of the day with some tenderness and slight pain at the outer aspect of the ankle and to know if she could start rehab.  She asked to be worked in for further evaluation which I was happy to accommodate.     Patient was seen on 9/11/2024 stating that she is getting better swelling had diminished she had only some mild pain over the anterolateral ankle and anterior ankle mainly when she plantarflexed with some discomfort on the peroneal musculature but none along the medial ankle at all or posterior lateral ankle.  She had been using her boot and 2 crutches the majority of the time going occasionally in the house with just her boot and tolerating that.  Pain was rated 1 out of 10.  At that time I did not recommend anything from a surgical standpoint.  She would like to transition to 1 crutch into her boot over the next week and then start transitioning to an ASO brace in the house and boot out of the house for a week or so and if doing well then transition to ASO brace in and out of the house and still with 1 crutch or cane.  She was also referred to physical therapy    She was to have been seen by 3 weeks thereafter    However she had to cancel the appointment several times as she was out of town for work.  She has not been using her brace for the last 4 weeks or so but reports that her ankle is \"not great.  She has a feeling of tightness anteriorly and has also had some intermittent heel pain especially with walking barefoot.  She has not had feelings of instability but does feel like she has had some calf atrophy.   pain is rated 2 out of 10  HPI    ROS: ankle pain  Past Medical History:   Diagnosis Date    Ankle sprain 8/21/24    Cervical dysplasia     COVID     Fracture of ankle 8/21/24    Graves disease     followed by Dr. Joy    HPV (human papilloma virus) infection     Hyperthyroidism [id number]; 2021-present    Mild Graves Disease; levels normal now    LGSIL on Pap smear of cervix     " "Urinary tract infection     not for several years    Varicella age 5       Physical Exam:   Vitals:    12/02/24 1451   Temp: 98.2 °F (36.8 °C)   Weight: 71.9 kg (158 lb 9.6 oz)   Height: 165.1 cm (65\")   PainSc:   2     Well developed with normal mood.  On exam she has some mild tenderness over the anterior ankle on maximum plantarflexion.  She had good dorsiflexion strength and was without focal instability over the anterolateral ankle and without focal pain on the peroneal tendons nor medial ankle.  Mild discomfort anterolaterally.    She did have some mild discomfort at the insertion of the plantar fascia at the plantar aspect of the heel but no exacerbation of pain with medial lateral calcaneal compression.      Radiology: 3 views of the right ankle ordered evaluate pain and alignment reviewed and compared to previous x-rays.  I do not see any malalignment of the talus within the mortise.  The small avulsion over the lateral process of the talus just lateral to it previously is not as clearly evident and may be coalesced.  I do not see any malalignment of the talus within the mortise or widening of the syndesmosis.      MRI films and report of the right ankle dated 9/4/2024 reviewed which show focal nondisplaced subcortical impaction fractures anteromedially left medial talar head and neck with tiny avulsion fracture from the lateral process of the talus that was better appreciated on prior radiographs.  There was close abutment of the anterior process calcaneus and lateral navicular without evidence of osseous coalition.  There was posttraumatic tibiotalar joint effusion and suspected partial thickness injury of the anterior joint capsule and likely posttraumatic posterior subtalar joint effusion.  Peroneal tendons were intact.  There is moderate to high-grade partial-thickness pain of the ATFL.  CFL was intact as was the deltoid and syndesmosis.        Assessment/Plan: 1.  Right ankle anterolateral ligamentous " injury of the ATFL  2.  Right ankle focal nondisplaced subcortical impaction fractures anterior medial talus and medial talar head and neck  3.  Right ankle tiny avulsion fracture from lateral process of the talus  4.  Right ankle posttraumatic tibiotalar joint effusion with suspected partial thickness injury to the anterior joint capsule  5.  Right ankle posttraumatic posterior subtalar joint effusion  6.  Right heel pain consistent with plantar fasciitis      We discussed treatment going forward and nothing I would recommend from a surgical standpoint for her ankle at this time especially given her lack of instability..  I feel that her heel pain is fairly consistent with plantar fasciitis.  I reviewed her x-ray findings with her    She was instructed on heel cord stretching exercises.  Instruction sheet was provided and demonstrated for her.  She was also instructed on ice massage.  We decided to hold off on a night splint for the heel pain but was instructed on towel stretch prior to first up in the morning and avoidance of barefoot ambulation and continues to tennis shoes.  She was fitted with a gel heel pad and is can use these bilaterally to keep her being off balance.    The ankle is nothing I would recommend from a surgical standpoint again and we will get her started in some physical therapy for both the ankle, feeling of calf atrophy and the heel pain and was prescribed compounding cream to apply to the ankle several times daily    We had a very pleasant visit and if anything worsens to let me know otherwise I will see her back in 6 to 8 weeks with x-ray of her right ankle.

## 2024-12-03 DIAGNOSIS — N64.89 BREAST ASYMMETRY: Primary | ICD-10-CM

## 2024-12-03 DIAGNOSIS — R92.1 CALCIFICATION OF RIGHT BREAST: ICD-10-CM

## 2024-12-10 ENCOUNTER — APPOINTMENT (OUTPATIENT)
Dept: WOMENS IMAGING | Facility: HOSPITAL | Age: 49
End: 2024-12-10
Payer: COMMERCIAL

## 2024-12-10 PROCEDURE — 77065 DX MAMMO INCL CAD UNI: CPT | Performed by: RADIOLOGY

## 2024-12-10 PROCEDURE — 76642 ULTRASOUND BREAST LIMITED: CPT | Performed by: RADIOLOGY

## 2024-12-10 PROCEDURE — 77061 BREAST TOMOSYNTHESIS UNI: CPT | Performed by: RADIOLOGY

## 2024-12-10 PROCEDURE — G0279 TOMOSYNTHESIS, MAMMO: HCPCS | Performed by: RADIOLOGY

## 2024-12-12 ENCOUNTER — TELEPHONE (OUTPATIENT)
Dept: OBSTETRICS AND GYNECOLOGY | Age: 49
End: 2024-12-12
Payer: COMMERCIAL

## 2024-12-12 NOTE — TELEPHONE ENCOUNTER
PT is calling in regards to her follow up mammogram. PT is really nervous and wants to know if You has received those results.

## 2024-12-13 ENCOUNTER — TELEPHONE (OUTPATIENT)
Dept: OBSTETRICS AND GYNECOLOGY | Age: 49
End: 2024-12-13
Payer: COMMERCIAL

## 2024-12-13 NOTE — TELEPHONE ENCOUNTER
12/13/2024 Pt called back wanting to know what [facility] said regarding her imaging being read sooner. I explained to pt that I spoke with Veronica BRADY and she stated she would try and get her imaging read. I also explained to pt that radiologists have by law 30 days to read imaging, however imaging is typically read within 2 wks. I told pt I would contact her as soon as I had report. Pt seemed okay with my explanation.

## 2024-12-13 NOTE — TELEPHONE ENCOUNTER
12/13/2024 Pt called today very upset that dx breast imaging hasn't been read yet. I called over to [facility] and asked if pts imaging could be moved up to being read. Pts imaging was done on 12/10/24.

## 2024-12-16 ENCOUNTER — TELEPHONE (OUTPATIENT)
Dept: OBSTETRICS AND GYNECOLOGY | Age: 49
End: 2024-12-16
Payer: COMMERCIAL

## 2024-12-16 DIAGNOSIS — R92.1 CALCIFICATION OF RIGHT BREAST: Primary | ICD-10-CM

## 2024-12-16 DIAGNOSIS — N60.01 CYST, BREAST, RIGHT: ICD-10-CM

## 2024-12-16 NOTE — TELEPHONE ENCOUNTER
12/16/2024 Breast imaging results to pt by Dr Orta and order placed for R breast stereotatic biopsy and 6mo f/u R breast US. I scheduled pt for bx appt on 12/23/24 but pt has family trip scheduled out of country from 12/27-1/5. Pt would be unable to submerge in water for 7-10 days and pt is traveling to [facility]. Appt rescheduled for pt on 1/8/25 9am at [facility].

## 2024-12-16 NOTE — TELEPHONE ENCOUNTER
Spoke with pt   Reviewed diagnostic call back right breast  Stereotactic biopsy recommended,  Will schedule first available  All questions answered.

## 2025-01-08 ENCOUNTER — APPOINTMENT (OUTPATIENT)
Dept: WOMENS IMAGING | Facility: HOSPITAL | Age: 50
End: 2025-01-08
Payer: COMMERCIAL

## 2025-01-08 ENCOUNTER — HOSPITAL ENCOUNTER (OUTPATIENT)
Dept: MAMMOGRAPHY | Facility: HOSPITAL | Age: 50
Discharge: HOME | End: 2025-01-08
Payer: COMMERCIAL

## 2025-01-08 ENCOUNTER — LAB REQUISITION (OUTPATIENT)
Dept: LAB | Facility: HOSPITAL | Age: 50
End: 2025-01-08
Payer: COMMERCIAL

## 2025-01-08 DIAGNOSIS — R92.1 MAMMOGRAPHIC CALCIFICATION FOUND ON DIAGNOSTIC IMAGING OF BREAST: ICD-10-CM

## 2025-01-08 DIAGNOSIS — R92.1 CALCIFICATION OF RIGHT BREAST: ICD-10-CM

## 2025-01-08 PROCEDURE — A4648 IMPLANTABLE TISSUE MARKER: HCPCS | Performed by: RADIOLOGY

## 2025-01-08 PROCEDURE — 19081 BX BREAST 1ST LESION STRTCTC: CPT | Performed by: RADIOLOGY

## 2025-01-08 PROCEDURE — C1819 TISSUE LOCALIZATION-EXCISION: HCPCS | Performed by: RADIOLOGY

## 2025-01-08 PROCEDURE — 88305 TISSUE EXAM BY PATHOLOGIST: CPT | Performed by: OBSTETRICS & GYNECOLOGY

## 2025-01-08 PROCEDURE — 76098 X-RAY EXAM SURGICAL SPECIMEN: CPT

## 2025-01-10 LAB
CYTO UR: NORMAL
DX PRELIMINARY: NORMAL
LAB AP CASE REPORT: NORMAL
LAB AP INTRADEPARTMENTAL CONSULT: NORMAL
PATH REPORT.FINAL DX SPEC: NORMAL
PATH REPORT.GROSS SPEC: NORMAL

## 2025-03-19 ENCOUNTER — TELEPHONE (OUTPATIENT)
Dept: SURGERY | Facility: CLINIC | Age: 50
End: 2025-03-19
Payer: COMMERCIAL

## 2025-03-19 NOTE — TELEPHONE ENCOUNTER
Hub staff attempted to follow warm transfer process and was unsuccessful     Caller: Eileen Johnston A    Relationship to patient: Self    Best call back number: [phone]     Patient is needing: PT DID NOT RECEIVE C-SCOPE PREP INSTRUCTIONS AND IS REQUESTING THEM, PLEASE - STATES CAN BE SENT THROUGH [employer] MESSAGE - IS ALSO REQUESTING A CALL TO CONFIRM

## 2025-03-21 ENCOUNTER — HOSPITAL ENCOUNTER (OUTPATIENT)
Facility: HOSPITAL | Age: 50
Setting detail: HOSPITAL OUTPATIENT SURGERY
Discharge: HOME | End: 2025-03-21
Attending: COLON & RECTAL SURGERY | Admitting: COLON & RECTAL SURGERY
Payer: COMMERCIAL

## 2025-03-21 ENCOUNTER — ANESTHESIA (OUTPATIENT)
Dept: GASTROENTEROLOGY | Facility: HOSPITAL | Age: 50
End: 2025-03-21
Payer: COMMERCIAL

## 2025-03-21 ENCOUNTER — ANESTHESIA EVENT (OUTPATIENT)
Dept: GASTROENTEROLOGY | Facility: HOSPITAL | Age: 50
End: 2025-03-21
Payer: COMMERCIAL

## 2025-03-21 VITALS
OXYGEN SATURATION: 93 % | HEART RATE: 59 BPM | HEIGHT: 65 IN | SYSTOLIC BLOOD PRESSURE: 114 MMHG | DIASTOLIC BLOOD PRESSURE: 75 MMHG | BODY MASS INDEX: 25.64 KG/M2 | RESPIRATION RATE: 19 BRPM | WEIGHT: 153.9 LBS

## 2025-03-21 PROCEDURE — 25010000002 PROPOFOL 1000 MG/100ML EMULSION: Performed by: NURSE ANESTHETIST, CERTIFIED REGISTERED

## 2025-03-21 PROCEDURE — 25810000003 LACTATED RINGERS PER 1000 ML: Performed by: COLON & RECTAL SURGERY

## 2025-03-21 PROCEDURE — 81025 URINE PREGNANCY TEST: CPT | Performed by: COLON & RECTAL SURGERY

## 2025-03-21 PROCEDURE — 45378 DIAGNOSTIC COLONOSCOPY: CPT | Performed by: COLON & RECTAL SURGERY

## 2025-03-21 PROCEDURE — 25010000002 LIDOCAINE 2% SOLUTION: Performed by: NURSE ANESTHETIST, CERTIFIED REGISTERED

## 2025-03-21 RX ORDER — LIDOCAINE HYDROCHLORIDE 20 MG/ML
INJECTION, SOLUTION INFILTRATION; PERINEURAL AS NEEDED
Status: DISCONTINUED | OUTPATIENT
Start: 2025-03-21 | End: 2025-03-21 | Stop reason: SURG

## 2025-03-21 RX ORDER — SODIUM CHLORIDE 0.9 % (FLUSH) 0.9 %
10 SYRINGE (ML) INJECTION AS NEEDED
Status: DISCONTINUED | OUTPATIENT
Start: 2025-03-21 | End: 2025-03-21 | Stop reason: HOSPADM

## 2025-03-21 RX ORDER — SODIUM CHLORIDE, SODIUM LACTATE, POTASSIUM CHLORIDE, CALCIUM CHLORIDE 600; 310; 30; 20 MG/100ML; MG/100ML; MG/100ML; MG/100ML
1000 INJECTION, SOLUTION INTRAVENOUS CONTINUOUS
Status: DISCONTINUED | OUTPATIENT
Start: 2025-03-21 | End: 2025-03-21 | Stop reason: HOSPADM

## 2025-03-21 RX ORDER — PROPOFOL 10 MG/ML
INJECTION, EMULSION INTRAVENOUS CONTINUOUS PRN
Status: DISCONTINUED | OUTPATIENT
Start: 2025-03-21 | End: 2025-03-21 | Stop reason: SURG

## 2025-03-21 RX ADMIN — PROPOFOL INJECTABLE EMULSION 160 MCG/KG/MIN: 10 INJECTION, EMULSION INTRAVENOUS at 13:36

## 2025-03-21 RX ADMIN — LIDOCAINE HYDROCHLORIDE 60 MG: 20 INJECTION, SOLUTION INFILTRATION; PERINEURAL at 13:35

## 2025-03-21 RX ADMIN — SODIUM CHLORIDE, POTASSIUM CHLORIDE, SODIUM LACTATE AND CALCIUM CHLORIDE 1000 ML: 600; 310; 30; 20 INJECTION, SOLUTION INTRAVENOUS at 13:30

## 2025-03-21 NOTE — H&P
Eileen Johnston is a 49 y.o. female  who is referred by Marisabel Choi MD for a colonoscopy. She   has an indications: screening for colon cancer.     She denies any change in bowel function, melena, or hematochezia.    Past Medical History:   Diagnosis Date    Ankle sprain 8/21/24    Cervical dysplasia     COVID     Fracture of ankle 8/21/24    Graves disease     followed by Dr. Joy    HPV (human papilloma virus) infection     Hyperthyroidism 1968-1437; 2021-present    Mild Graves Disease; levels normal now    LGSIL on Pap smear of cervix     Urinary tract infection     not for several years    Varicella age 5       Past Surgical History:   Procedure Laterality Date    BREAST BIOPSY  2021    BREAST SURGERY Left     Titanium markers from bx    CERVICAL BIOPSY  W/ LOOP ELECTRODE EXCISION      COLPOSCOPY W/ BIOPSY / CURETTAGE      HAND SURGERY  2018    Right index    MAMMO CORE NEEDLE BIOPSY UNILATERAL      MOUTH SURGERY      WISDOM TOOTH EXTRACTION         Medications Prior to Admission   Medication Sig Dispense Refill Last Dose/Taking    Diclofenac Sodium 4 %, Topiramate 2 %, cloNIDine HCl 0.2 %, Lidocaine HCl 5 % Apply 1-2 g topically to the appropriate area as directed 3 (Three) to 4 (Four) times daily. 90 g 1        No Known Allergies    Family History   Problem Relation Age of Onset    Arthritis Mother         hands    Alcohol abuse Father         alcohol free for over 10 years    Diabetes Father         no longer after losing 100 lbs post bypass    Hyperlipidemia Father         no longer after losing 100 lbs following bypass    Hypertension Father         no longer after losing 100 lbs following bypass    Heart disease Father         bypass    Coronary artery disease Father         Bypass (5)    Cancer Father         lung cancer; heavy smoker    Miscarriages / Stillbirths Sister         2 miscarriages    No Known Problems Maternal Aunt     No Known Problems Paternal Aunt     Thyroid disease Paternal Aunt          hypothyrodism    Kidney cancer Maternal Grandmother     Cancer Maternal Grandmother         kidney cancer; kidney removed    Hyperlipidemia Maternal Grandmother     Hypertension Maternal Grandmother     Stroke Maternal Grandmother         cause of death at 92    Thyroid disease Maternal Grandmother         hypothyroidism    Cancer Maternal Grandfather         prostate cancer    Prostate cancer Maternal Grandfather     No Known Problems Paternal Grandmother     Heart disease Paternal Grandfather         bypass    Cancer Paternal Grandfather         skin cancer    Diabetes Paternal Grandfather     Coronary artery disease Paternal Grandfather         Bypass    BRCA 1/2 Neg Hx     Breast cancer Neg Hx     Colon cancer Neg Hx     Endometrial cancer Neg Hx     Ovarian cancer Neg Hx        Social History     Socioeconomic History    Marital status: Single    Number of children: 0    Years of education: post   Tobacco Use    Smoking status: Never    Smokeless tobacco: Never    Tobacco comments:     No caffeine use    Vaping Use    Vaping status: Never Used   Substance and Sexual Activity    Alcohol use: Yes     Alcohol/week: 3.0 standard drinks of alcohol     Types: 3 Glasses of wine per week     Comment: 3-4/week    Drug use: No    Sexual activity: Yes     Partners: Male     Birth control/protection: Condom       ROS    There were no vitals filed for this visit.      Physical Exam  Constitutional:       Appearance: She is well-developed.   HENT:      Head: Normocephalic and atraumatic.   Eyes:      Pupils: Pupils are equal, round, and reactive to light.   Cardiovascular:      Rate and Rhythm: Regular rhythm.   Pulmonary:      Effort: Pulmonary effort is normal.   Abdominal:      General: There is no distension.      Palpations: Abdomen is soft.   Musculoskeletal:         General: Normal range of motion.   Skin:     General: Skin is warm and dry.   Neurological:      Mental Status: She is alert and oriented to person,  place, and time.   Psychiatric:         Thought Content: Thought content normal.         Judgment: Judgment normal.           Assessment & Plan      indications: screening for colon cancer         I recommend colonoscopy.  I described risks, benefits of the procedure with the patient including but not limited to bleeding, infection, possibility of perforation and possible polypectomy. All of the patient's questions were answered and they would like to proceed with the above recommendations.

## 2025-03-21 NOTE — ANESTHESIA PREPROCEDURE EVALUATION
Anesthesia Evaluation     Patient summary reviewed and Nursing notes reviewed                Airway   Mallampati: II  TM distance: >3 FB  Neck ROM: full  Anterior  Dental - normal exam     Pulmonary - negative pulmonary ROS   Cardiovascular - negative cardio ROS        Neuro/Psych- negative ROS  GI/Hepatic/Renal/Endo    (+) thyroid problem hypothyroidism    Musculoskeletal (-) negative ROS    Abdominal    Substance History - negative use     OB/GYN negative ob/gyn ROS         Other                    Anesthesia Plan    ASA 2     MAC     intravenous induction     Anesthetic plan, risks, benefits, and alternatives have been provided, discussed and informed consent has been obtained with: patient.    CODE STATUS:

## 2025-03-21 NOTE — ANESTHESIA POSTPROCEDURE EVALUATION
Patient: Eileen Johnston    Procedure Summary       Date: 03/21/25 Room / Location:  [facility] /  [facility]    Anesthesia Start: 1331 Anesthesia Stop: 1351    Procedure: COLONOSCOPY to cecum Diagnosis:       Screening for colon cancer      (Screening for colon cancer [Z12.11])    Surgeons: Marisabel Choi MD Provider: Jailene Nino MD    Anesthesia Type: MAC ASA Status: 2            Anesthesia Type: MAC    Vitals  Vitals Value Taken Time   /75 03/21/25 14:14   Temp     Pulse 60 03/21/25 14:16   Resp 19 03/21/25 14:13   SpO2 100 % 03/21/25 14:16   Vitals shown include unfiled device data.        Post Anesthesia Care and Evaluation    Patient location during evaluation: bedside  Patient participation: complete - patient participated  Level of consciousness: awake  Pain management: adequate    Airway patency: patent  Anesthetic complications: No anesthetic complications    Cardiovascular status: acceptable  Respiratory status: acceptable  Hydration status: acceptable

## 2025-06-10 ENCOUNTER — APPOINTMENT (OUTPATIENT)
Dept: WOMENS IMAGING | Facility: HOSPITAL | Age: 50
End: 2025-06-10
Payer: COMMERCIAL

## 2025-06-10 PROCEDURE — 76642 ULTRASOUND BREAST LIMITED: CPT | Performed by: RADIOLOGY

## 2025-06-12 DIAGNOSIS — N60.01 CYST, BREAST, RIGHT: Primary | ICD-10-CM

## 2025-06-12 DIAGNOSIS — Z12.31 SCREENING MAMMOGRAM FOR BREAST CANCER: ICD-10-CM

## 2025-07-14 ENCOUNTER — OFFICE VISIT (OUTPATIENT)
Dept: OBSTETRICS AND GYNECOLOGY | Age: 50
End: 2025-07-14
Payer: COMMERCIAL

## 2025-07-14 VITALS
DIASTOLIC BLOOD PRESSURE: 72 MMHG | SYSTOLIC BLOOD PRESSURE: 108 MMHG | WEIGHT: 154 LBS | HEIGHT: 65 IN | BODY MASS INDEX: 25.66 KG/M2

## 2025-07-14 DIAGNOSIS — N95.1 MENOPAUSAL SYMPTOMS: ICD-10-CM

## 2025-07-14 DIAGNOSIS — Z01.419 ENCOUNTER FOR GYNECOLOGICAL EXAMINATION WITHOUT ABNORMAL FINDING: Primary | ICD-10-CM

## 2025-07-14 DIAGNOSIS — Z01.419 WELL FEMALE EXAM WITH ROUTINE GYNECOLOGICAL EXAM: ICD-10-CM

## 2025-07-14 DIAGNOSIS — Z11.51 SCREENING FOR HUMAN PAPILLOMAVIRUS (HPV): ICD-10-CM

## 2025-07-14 DIAGNOSIS — Z12.4 SCREENING FOR MALIGNANT NEOPLASM OF CERVIX: ICD-10-CM

## 2025-07-14 PROCEDURE — 99396 PREV VISIT EST AGE 40-64: CPT | Performed by: OBSTETRICS & GYNECOLOGY

## 2025-07-14 NOTE — PROGRESS NOTES
Routine Annual Visit    2025    Patient: Eileen Johnston          MR#:[id number]      Chief Complaint   Patient presents with    Gynecologic Exam     Annual Exam - last pap 24 neg, mammo 24, colonoscopy 3/21/25, pt has no complaints today       History of Present Illness    49 y.o. female  who presents for annual exam.     Patient has gone 6 months without a.  She does have some night sweats  She does not have any hot flashes  She had worse hot flashes night sweats in the past  Pap smear is due  Mammogram due in December  Colonoscopy is up-to-date  She is still considering assisted reproduction with donor egg  She would like hormones checked today    Discussed risk benefits and alternatives of hormone replacement therapy      No LMP recorded.  Obstetric History:  OB History          0    Para   0    Term   0       0    AB   0    Living   0         SAB   0    IAB   0    Ectopic   0    Molar   0    Multiple   0    Live Births   0               Menstrual History:     No LMP recorded.       Sexual History:       ________________________________________  Patient Active Problem List   Diagnosis    Graves disease    Crushing injury of finger of right hand    Injury of peroneal tendon of right foot    Sprain of right ankle    Closed nondisplaced avulsion fracture of left talus    Screening for colon cancer    Closed nondisplaced fracture of medial malleolus of right tibia    Plantar fasciitis       Past Medical History:   Diagnosis Date    Ankle sprain 22    Cervical dysplasia     COVID     Fracture of ankle 22    Graves disease     followed by Dr. Joy    History of cervical dysplasia Several years ago    HPV (human papilloma virus) infection     Hyperthyroidism 5719-3702; -present    Mild Graves Disease; levels normal now    LGSIL on Pap smear of cervix     Urinary tract infection     not for several years    Varicella age 5       Past Surgical History:   Procedure  "Laterality Date    BREAST BIOPSY Right 2021    BREAST BIOPSY Left 01/2025    marker in place x2    CERVICAL BIOPSY  W/ LOOP ELECTRODE EXCISION      COLONOSCOPY N/A 03/21/2025    ENTIRE COLON WNL, RESCOPE IN 5 YRS, DR. ELLE FARR [facility]    COLPOSCOPY W/ BIOPSY / CURETTAGE      HAND SURGERY  2018    Right index    MAMMO CORE NEEDLE BIOPSY UNILATERAL      WISDOM TOOTH EXTRACTION         Social History     Tobacco Use   Smoking Status Never   Smokeless Tobacco Never   Tobacco Comments    No caffeine use        currently has no medications in their medication list.  ________________________________________    Current contraception: none  History of abnormal Pap smear: yes - HPV in past  Family history of Breast cancer: no  Family history of uterine or ovarian cancer: no  Family History of colon cancer/colon polyps: no  History of abnormal mammogram: yes - callback and breast biopsy benign      The following portions of the patient's history were reviewed and updated as appropriate: allergies, current medications, past family history, past medical history, past social history, past surgical history, and problem list.    Review of Systems    Pertinent items are noted in HPI.     Objective   Physical Exam    /72 (BP Location: Left arm, Patient Position: Sitting)   Ht 165.1 cm (65\")   Wt 69.9 kg (154 lb)   BMI 25.63 kg/m²    BP Readings from Last 3 Encounters:   07/14/25 108/72   03/21/25 114/75   07/11/24 124/74      Wt Readings from Last 3 Encounters:   07/14/25 69.9 kg (154 lb)   03/21/25 69.8 kg (153 lb 14.4 oz)   12/02/24 71.9 kg (158 lb 9.6 oz)      BMI: Estimated body mass index is 25.63 kg/m² as calculated from the following:    Height as of this encounter: 165.1 cm (65\").    Weight as of this encounter: 69.9 kg (154 lb).      General:   alert, appears stated age, and cooperative   Abdomen: soft, non-tender, without masses or organomegaly   Breast: inspection negative, no nipple discharge or bleeding, no " masses or nodularity palpable   Vulva: normal, Bartholin's, Urethra, Wheaton's normal   Vagina: normal mucosa   Cervix: no cervical motion tenderness and no lesions   Uterus: normal size, mobile, and non-tender   Adnexa: no mass, fullness, tenderness     Assessment:    1. Normal annual exam   Assessment     ICD-10-CM ICD-9-CM   1. Encounter for gynecological examination without abnormal finding  Z01.419 V72.31   2. Well female exam with routine gynecological exam  Z01.419 V72.31   3. Screening for human papillomavirus (HPV)  Z11.51 V73.81   4. Screening for malignant neoplasm of cervix  Z12.4 V76.2   5. Menopausal symptoms  N95.1 627.2     Plan:    Plan     []  Mammogram request made  [x]  PAP done  []  Labs:   []  GC/Chl/TV  []  DEXA scan   []  Referral for colonoscopy:       Diagnoses and all orders for this visit:    1. Encounter for gynecological examination without abnormal finding (Primary)    2. Well female exam with routine gynecological exam  -     IGP, Apt HPV,rfx 16 / 18,45    3. Screening for human papillomavirus (HPV)  -     IGP, Apt HPV,rfx 16 / 18,45    4. Screening for malignant neoplasm of cervix  -     IGP, Apt HPV,rfx 16 / 18,45    5. Menopausal symptoms  -     Follicle Stimulating Hormone  -     Estradiol  -     Progesterone            Counseling:  --Nutrition: Stressed importance of moderation and caloric balance, stressed fresh fruit and vegetables  --Exercise: Stressed the importance of regular exercise. 3-5 times weekly   - Discussed screening mammogram recommendations.   --Discussed benefits of screening colonoscopy- age 45 unless FH  --Discussed pap smear screening recommendations

## 2025-07-15 LAB
ESTRADIOL SERPL-MCNC: 10.1 PG/ML
FSH SERPL-ACNC: 75 MIU/ML
PROGEST SERPL-MCNC: 0.2 NG/ML

## 2025-07-16 LAB
CYTOLOGIST CVX/VAG CYTO: NORMAL
CYTOLOGY CVX/VAG DOC CYTO: NORMAL
CYTOLOGY CVX/VAG DOC THIN PREP: NORMAL
DX ICD CODE: NORMAL
HPV I/H RISK 4 DNA CVX QL PROBE+SIG AMP: NEGATIVE
OTHER STN SPEC: NORMAL
SERVICE CMNT-IMP: NORMAL
STAT OF ADQ CVX/VAG CYTO-IMP: NORMAL

## (undated) DEVICE — KT ORCA ORCAPOD DISP STRL

## (undated) DEVICE — SENSR O2 OXIMAX FNGR A/ 18IN NONSTR

## (undated) DEVICE — CANN O2 ETCO2 FITS ALL CONN CO2 SMPL A/ 7IN DISP LF

## (undated) DEVICE — LN SMPL CO2 SHTRM SD STREAM W/M LUER

## (undated) DEVICE — TUBING, SUCTION, 1/4" X 10', STRAIGHT: Brand: MEDLINE

## (undated) DEVICE — ADAPT CLN BIOGUARD AIR/H2O DISP